# Patient Record
Sex: FEMALE | Race: WHITE | NOT HISPANIC OR LATINO | Employment: OTHER | URBAN - METROPOLITAN AREA
[De-identification: names, ages, dates, MRNs, and addresses within clinical notes are randomized per-mention and may not be internally consistent; named-entity substitution may affect disease eponyms.]

---

## 2018-10-12 LAB — HCV AB SER-ACNC: NEGATIVE

## 2019-09-13 LAB
CHOLEST SERPL-MSCNC: 146 MG/DL (ref 0–200)
HDL/CHOLESTEROL RATIO: 3.2
HDLC SERPL-MCNC: 46 MG/DL
LDLC SERPL CALC-MCNC: 71 MG/DL
TRIGL SERPL-MCNC: 143 MG/DL

## 2019-11-21 ENCOUNTER — OFFICE VISIT (OUTPATIENT)
Dept: FAMILY MEDICINE | Facility: CLINIC | Age: 64
End: 2019-11-21
Payer: MEDICARE

## 2019-11-21 VITALS
DIASTOLIC BLOOD PRESSURE: 78 MMHG | HEIGHT: 60 IN | RESPIRATION RATE: 16 BRPM | WEIGHT: 199.81 LBS | HEART RATE: 78 BPM | BODY MASS INDEX: 39.23 KG/M2 | TEMPERATURE: 98 F | OXYGEN SATURATION: 99 % | SYSTOLIC BLOOD PRESSURE: 124 MMHG

## 2019-11-21 DIAGNOSIS — F17.219 CIGARETTE NICOTINE DEPENDENCE WITH NICOTINE-INDUCED DISORDER: Chronic | ICD-10-CM

## 2019-11-21 DIAGNOSIS — E87.8 HYPOCHLOREMIA: ICD-10-CM

## 2019-11-21 DIAGNOSIS — H65.92 LEFT OTITIS MEDIA WITH EFFUSION: Primary | ICD-10-CM

## 2019-11-21 DIAGNOSIS — F33.1 MODERATE EPISODE OF RECURRENT MAJOR DEPRESSIVE DISORDER: ICD-10-CM

## 2019-11-21 DIAGNOSIS — E66.01 CLASS 2 SEVERE OBESITY DUE TO EXCESS CALORIES WITH SERIOUS COMORBIDITY AND BODY MASS INDEX (BMI) OF 39.0 TO 39.9 IN ADULT: ICD-10-CM

## 2019-11-21 DIAGNOSIS — E87.1 HYPONATREMIA: ICD-10-CM

## 2019-11-21 DIAGNOSIS — R79.89 LOW TSH LEVEL: ICD-10-CM

## 2019-11-21 DIAGNOSIS — I10 ESSENTIAL HYPERTENSION: Chronic | ICD-10-CM

## 2019-11-21 DIAGNOSIS — E83.42 HYPOMAGNESEMIA: ICD-10-CM

## 2019-11-21 DIAGNOSIS — E53.8 LOW SERUM VITAMIN B12: ICD-10-CM

## 2019-11-21 DIAGNOSIS — R19.7 DIARRHEA, UNSPECIFIED TYPE: ICD-10-CM

## 2019-11-21 DIAGNOSIS — J30.9 CHRONIC ALLERGIC RHINITIS: ICD-10-CM

## 2019-11-21 DIAGNOSIS — E11.42 TYPE 2 DIABETES MELLITUS WITH DIABETIC POLYNEUROPATHY, WITHOUT LONG-TERM CURRENT USE OF INSULIN: ICD-10-CM

## 2019-11-21 DIAGNOSIS — M17.11 PRIMARY OSTEOARTHRITIS OF RIGHT KNEE: ICD-10-CM

## 2019-11-21 DIAGNOSIS — Z79.899 ENCOUNTER FOR LONG-TERM CURRENT USE OF MEDICATION: ICD-10-CM

## 2019-11-21 DIAGNOSIS — Z13.21 ENCOUNTER FOR VITAMIN DEFICIENCY SCREENING: ICD-10-CM

## 2019-11-21 DIAGNOSIS — Z13.820 SCREENING FOR OSTEOPOROSIS: ICD-10-CM

## 2019-11-21 DIAGNOSIS — M85.89 OSTEOPENIA OF MULTIPLE SITES: ICD-10-CM

## 2019-11-21 DIAGNOSIS — J43.9 PULMONARY EMPHYSEMA, UNSPECIFIED EMPHYSEMA TYPE: Chronic | ICD-10-CM

## 2019-11-21 PROBLEM — E89.41 SYMPTOMATIC POSTSURGICAL MENOPAUSE: Chronic | Status: ACTIVE | Noted: 2019-11-21

## 2019-11-21 PROBLEM — M43.16 SPONDYLOLISTHESIS OF LUMBAR REGION: Chronic | Status: ACTIVE | Noted: 2019-11-21

## 2019-11-21 PROBLEM — E78.5 HYPERLIPIDEMIA: Status: ACTIVE | Noted: 2019-11-21

## 2019-11-21 PROBLEM — J43.8 OTHER EMPHYSEMA: Chronic | Status: ACTIVE | Noted: 2019-11-21

## 2019-11-21 PROBLEM — M43.06 LUMBAR SPONDYLOLYSIS: Chronic | Status: ACTIVE | Noted: 2019-11-21

## 2019-11-21 PROBLEM — E78.2 MIXED HYPERLIPIDEMIA: Chronic | Status: ACTIVE | Noted: 2019-11-21

## 2019-11-21 PROBLEM — E89.41 SYMPTOMATIC POSTSURGICAL MENOPAUSE: Status: ACTIVE | Noted: 2019-11-21

## 2019-11-21 PROCEDURE — 99214 PR OFFICE/OUTPT VISIT, EST, LEVL IV, 30-39 MIN: ICD-10-PCS | Mod: 25,S$GLB,, | Performed by: INTERNAL MEDICINE

## 2019-11-21 PROCEDURE — 96372 PR INJECTION,THERAP/PROPH/DIAG2ST, IM OR SUBCUT: ICD-10-PCS | Mod: S$GLB,,, | Performed by: INTERNAL MEDICINE

## 2019-11-21 PROCEDURE — 99999 PR PBB SHADOW E&M-NEW PATIENT-LVL IV: ICD-10-PCS | Mod: PBBFAC,,, | Performed by: INTERNAL MEDICINE

## 2019-11-21 PROCEDURE — 99999 PR PBB SHADOW E&M-NEW PATIENT-LVL IV: CPT | Mod: PBBFAC,,, | Performed by: INTERNAL MEDICINE

## 2019-11-21 PROCEDURE — 96372 THER/PROPH/DIAG INJ SC/IM: CPT | Mod: S$GLB,,, | Performed by: INTERNAL MEDICINE

## 2019-11-21 PROCEDURE — 99214 OFFICE O/P EST MOD 30 MIN: CPT | Mod: 25,S$GLB,, | Performed by: INTERNAL MEDICINE

## 2019-11-21 RX ORDER — METOCLOPRAMIDE 5 MG/1
5 TABLET ORAL 2 TIMES DAILY PRN
COMMUNITY
End: 2019-11-21 | Stop reason: SDUPTHER

## 2019-11-21 RX ORDER — LANOLIN ALCOHOL/MO/W.PET/CERES
400 CREAM (GRAM) TOPICAL DAILY
COMMUNITY
End: 2019-11-21

## 2019-11-21 RX ORDER — ONDANSETRON HYDROCHLORIDE 8 MG/1
8 TABLET, FILM COATED ORAL EVERY 8 HOURS PRN
Qty: 90 TABLET | Refills: 0 | Status: SHIPPED | OUTPATIENT
Start: 2019-11-21 | End: 2020-01-31

## 2019-11-21 RX ORDER — LANOLIN ALCOHOL/MO/W.PET/CERES
400 CREAM (GRAM) TOPICAL DAILY
COMMUNITY
End: 2019-12-20

## 2019-11-21 RX ORDER — MONTELUKAST SODIUM 10 MG/1
10 TABLET ORAL NIGHTLY
Qty: 90 TABLET | Refills: 0 | Status: SHIPPED | OUTPATIENT
Start: 2019-11-21 | End: 2019-12-21

## 2019-11-21 RX ORDER — OXYCODONE AND ACETAMINOPHEN 10; 325 MG/1; MG/1
1 TABLET ORAL NIGHTLY PRN
Refills: 0 | COMMUNITY
Start: 2019-10-22 | End: 2019-12-21

## 2019-11-21 RX ORDER — LEVOTHYROXINE SODIUM 100 UG/1
100 TABLET ORAL DAILY
COMMUNITY
End: 2019-11-21

## 2019-11-21 RX ORDER — ONDANSETRON HYDROCHLORIDE 8 MG/1
TABLET, FILM COATED ORAL EVERY 8 HOURS PRN
COMMUNITY
End: 2019-11-21 | Stop reason: SDUPTHER

## 2019-11-21 RX ORDER — PRAVASTATIN SODIUM 40 MG/1
40 TABLET ORAL DAILY
COMMUNITY
End: 2020-01-31 | Stop reason: SDUPTHER

## 2019-11-21 RX ORDER — AZITHROMYCIN 250 MG/1
TABLET, FILM COATED ORAL
Qty: 6 TABLET | Refills: 0 | Status: SHIPPED | OUTPATIENT
Start: 2019-11-21 | End: 2019-11-26

## 2019-11-21 RX ORDER — METFORMIN HYDROCHLORIDE 1000 MG/1
1000 TABLET ORAL 2 TIMES DAILY WITH MEALS
COMMUNITY
End: 2020-01-31 | Stop reason: SDUPTHER

## 2019-11-21 RX ORDER — IPRATROPIUM BROMIDE AND ALBUTEROL SULFATE 2.5; .5 MG/3ML; MG/3ML
3 SOLUTION RESPIRATORY (INHALATION) EVERY 6 HOURS PRN
COMMUNITY
End: 2020-01-31 | Stop reason: SDUPTHER

## 2019-11-21 RX ORDER — QUETIAPINE FUMARATE 25 MG/1
100 TABLET, FILM COATED ORAL NIGHTLY
COMMUNITY
End: 2019-11-21 | Stop reason: SDUPTHER

## 2019-11-21 RX ORDER — ASPIRIN 81 MG/1
81 TABLET ORAL DAILY
COMMUNITY
End: 2020-07-06

## 2019-11-21 RX ORDER — METHYLPREDNISOLONE ACETATE 40 MG/ML
40 INJECTION, SUSPENSION INTRA-ARTICULAR; INTRALESIONAL; INTRAMUSCULAR; SOFT TISSUE
Status: COMPLETED | OUTPATIENT
Start: 2019-11-21 | End: 2019-11-21

## 2019-11-21 RX ORDER — PROMETHAZINE HYDROCHLORIDE AND DEXTROMETHORPHAN HYDROBROMIDE 6.25; 15 MG/5ML; MG/5ML
5 SYRUP ORAL EVERY 6 HOURS PRN
Qty: 240 ML | Refills: 0 | Status: SHIPPED | OUTPATIENT
Start: 2019-11-21 | End: 2019-12-01

## 2019-11-21 RX ORDER — DESVENLAFAXINE SUCCINATE 50 MG/1
50 TABLET, EXTENDED RELEASE ORAL DAILY
Qty: 30 TABLET | Refills: 0 | Status: SHIPPED | OUTPATIENT
Start: 2019-11-21 | End: 2019-12-20 | Stop reason: SDUPTHER

## 2019-11-21 RX ORDER — LEVOCETIRIZINE DIHYDROCHLORIDE 5 MG/1
5 TABLET, FILM COATED ORAL NIGHTLY
COMMUNITY
End: 2019-11-21

## 2019-11-21 RX ORDER — DICYCLOMINE HYDROCHLORIDE 10 MG/1
10 CAPSULE ORAL 3 TIMES DAILY PRN
COMMUNITY
End: 2019-11-21

## 2019-11-21 RX ORDER — PREDNISONE 20 MG/1
40 TABLET ORAL DAILY
Qty: 10 TABLET | Refills: 0 | Status: SHIPPED | OUTPATIENT
Start: 2019-11-22 | End: 2019-11-27

## 2019-11-21 RX ORDER — METOCLOPRAMIDE 5 MG/1
5 TABLET ORAL 2 TIMES DAILY PRN
Qty: 90 TABLET | Refills: 0 | Status: SHIPPED | OUTPATIENT
Start: 2019-11-21 | End: 2020-01-31

## 2019-11-21 RX ORDER — HYDROCODONE BITARTRATE AND ACETAMINOPHEN 7.5; 325 MG/1; MG/1
1 TABLET ORAL EVERY 8 HOURS PRN
Refills: 0 | COMMUNITY
Start: 2019-11-19 | End: 2019-12-04

## 2019-11-21 RX ORDER — LISINOPRIL AND HYDROCHLOROTHIAZIDE 12.5; 2 MG/1; MG/1
1 TABLET ORAL DAILY
COMMUNITY
End: 2019-12-19

## 2019-11-21 RX ORDER — PREGABALIN 100 MG/1
100 CAPSULE ORAL 2 TIMES DAILY
COMMUNITY
End: 2019-12-04 | Stop reason: SDUPTHER

## 2019-11-21 RX ORDER — DICLOFENAC SODIUM 1 MG/ML
1 SOLUTION/ DROPS OPHTHALMIC 4 TIMES DAILY
COMMUNITY
End: 2020-01-31

## 2019-11-21 RX ORDER — QUETIAPINE FUMARATE 100 MG/1
100 TABLET, FILM COATED ORAL NIGHTLY
Qty: 90 TABLET | Refills: 0 | Status: SHIPPED | OUTPATIENT
Start: 2019-11-21 | End: 2019-12-20

## 2019-11-21 RX ADMIN — METHYLPREDNISOLONE ACETATE 40 MG: 40 INJECTION, SUSPENSION INTRA-ARTICULAR; INTRALESIONAL; INTRAMUSCULAR; SOFT TISSUE at 11:11
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
## 2019-11-21 NOTE — PATIENT INSTRUCTIONS
Stop magnesium. Let me know if diarrhea gets better in 1 week.     Stop cymbalta. Starting pristiq. Start tomorrow. Let me know in 1 month how you're feeling.     i'm start allergy medicine- take at night. Sending you in antibiotic and steroid and cough syrup (don't take seroquel tonight with it).     Fasting labs when you're better.    Bone density when you're better.     Try taking 100 mg of seroquel when you're better and see if it helps.    See me in 1 month.     jarrell

## 2019-12-04 RX ORDER — HYDROCODONE BITARTRATE AND ACETAMINOPHEN 5; 325 MG/1; MG/1
1 TABLET ORAL EVERY 6 HOURS PRN
Refills: 0 | COMMUNITY
Start: 2019-11-27 | End: 2019-12-21

## 2019-12-04 RX ORDER — PREGABALIN 100 MG/1
100 CAPSULE ORAL 2 TIMES DAILY
Qty: 60 CAPSULE | Refills: 2 | Status: SHIPPED | OUTPATIENT
Start: 2019-12-04 | End: 2020-03-27 | Stop reason: SDUPTHER

## 2019-12-04 NOTE — TELEPHONE ENCOUNTER
----- Message from Enid Painting sent at 12/4/2019  9:10 AM CST -----  Contact: self  Type:  RX Refill Request    Who Called: Booker Carrasquillo or New Rx:refill  RX Name and Strength:pregabalin (LYRICA) 100 MG capsule  How is the patient currently taking it? (ex. 1XDay):2xday  Is this a 30 day or 90 day RX:30  Preferred Pharmacy with phone number:  [email or web address] [place], [place] - [address] [facility]  [address] [address] [address]  Phone: [phone] Fax: [phone]    Local or Mail Order:local  Ordering Provider:Dago  Would the patient rather a call back or a response via [facility]? call  Best Call Back Number:[phone]  Additional Information:

## 2019-12-06 ENCOUNTER — HOSPITAL ENCOUNTER (OUTPATIENT)
Dept: RADIOLOGY | Facility: HOSPITAL | Age: 64
Discharge: HOME | End: 2019-12-06
Attending: INTERNAL MEDICINE
Payer: MEDICARE

## 2019-12-06 DIAGNOSIS — Z79.899 ENCOUNTER FOR LONG-TERM CURRENT USE OF MEDICATION: ICD-10-CM

## 2019-12-06 DIAGNOSIS — H65.92 LEFT OTITIS MEDIA WITH EFFUSION: ICD-10-CM

## 2019-12-06 DIAGNOSIS — R19.7 DIARRHEA, UNSPECIFIED TYPE: ICD-10-CM

## 2019-12-06 DIAGNOSIS — E87.1 HYPONATREMIA: ICD-10-CM

## 2019-12-06 DIAGNOSIS — E83.42 HYPOMAGNESEMIA: ICD-10-CM

## 2019-12-06 DIAGNOSIS — Z13.820 SCREENING FOR OSTEOPOROSIS: ICD-10-CM

## 2019-12-06 DIAGNOSIS — M85.89 OSTEOPENIA OF MULTIPLE SITES: ICD-10-CM

## 2019-12-06 DIAGNOSIS — E11.42 TYPE 2 DIABETES MELLITUS WITH DIABETIC POLYNEUROPATHY, WITHOUT LONG-TERM CURRENT USE OF INSULIN: ICD-10-CM

## 2019-12-06 DIAGNOSIS — R79.89 LOW TSH LEVEL: ICD-10-CM

## 2019-12-06 DIAGNOSIS — Z13.21 ENCOUNTER FOR VITAMIN DEFICIENCY SCREENING: ICD-10-CM

## 2019-12-06 DIAGNOSIS — F33.1 MODERATE EPISODE OF RECURRENT MAJOR DEPRESSIVE DISORDER: ICD-10-CM

## 2019-12-06 DIAGNOSIS — E87.8 HYPOCHLOREMIA: ICD-10-CM

## 2019-12-06 DIAGNOSIS — E53.8 LOW SERUM VITAMIN B12: ICD-10-CM

## 2019-12-06 DIAGNOSIS — J30.9 CHRONIC ALLERGIC RHINITIS: ICD-10-CM

## 2019-12-06 PROCEDURE — 77080 DXA BONE DENSITY AXIAL: CPT | Mod: 26,,, | Performed by: RADIOLOGY

## 2019-12-06 PROCEDURE — 77080 DEXA BONE DENSITY SPINE HIP: ICD-10-PCS | Mod: 26,,, | Performed by: RADIOLOGY

## 2019-12-06 PROCEDURE — 77080 DXA BONE DENSITY AXIAL: CPT | Mod: TC,PO

## 2019-12-16 ENCOUNTER — TELEPHONE (OUTPATIENT)
Dept: FAMILY MEDICINE | Facility: CLINIC | Age: 64
End: 2019-12-16

## 2019-12-16 NOTE — TELEPHONE ENCOUNTER
----- Message from Uyen Jean sent at 12/16/2019  1:54 PM CST -----  Contact: Patient   Patient would like a call back at [phone], Regards to her medication.    Thanks  Td

## 2019-12-16 NOTE — TELEPHONE ENCOUNTER
Patient has symptoms of a URI (Chest congestion, cough, nasal congestion), patient is also nauseous from all of the phlem. Please advise.     [place]armacy [place].

## 2019-12-16 NOTE — TELEPHONE ENCOUNTER
----- Message from Luma Davis sent at 12/16/2019  8:59 AM CST -----  Contact: Pt  Please give pt a call at .[phone] (home) she is calling to speak with the nurse and won't give any details of the call

## 2019-12-16 NOTE — TELEPHONE ENCOUNTER
Please see if we can get her an appointment. She was just seen. I don't want to expose her to more antibiotics

## 2019-12-17 ENCOUNTER — TELEPHONE (OUTPATIENT)
Dept: FAMILY MEDICINE | Facility: CLINIC | Age: 64
End: 2019-12-17

## 2019-12-17 ENCOUNTER — HOSPITAL ENCOUNTER (OUTPATIENT)
Dept: RADIOLOGY | Facility: HOSPITAL | Age: 64
Discharge: HOME | End: 2019-12-17
Attending: NURSE PRACTITIONER
Payer: MEDICARE

## 2019-12-17 ENCOUNTER — OFFICE VISIT (OUTPATIENT)
Dept: FAMILY MEDICINE | Facility: CLINIC | Age: 64
End: 2019-12-17
Payer: MEDICARE

## 2019-12-17 VITALS
SYSTOLIC BLOOD PRESSURE: 110 MMHG | HEIGHT: 60 IN | WEIGHT: 196.19 LBS | BODY MASS INDEX: 38.52 KG/M2 | DIASTOLIC BLOOD PRESSURE: 58 MMHG | HEART RATE: 86 BPM | OXYGEN SATURATION: 96 % | TEMPERATURE: 98 F

## 2019-12-17 DIAGNOSIS — R06.2 WHEEZING: ICD-10-CM

## 2019-12-17 DIAGNOSIS — R05.9 COUGH: ICD-10-CM

## 2019-12-17 DIAGNOSIS — R50.9 FEVER, UNSPECIFIED FEVER CAUSE: ICD-10-CM

## 2019-12-17 DIAGNOSIS — J32.9 SINUSITIS, UNSPECIFIED CHRONICITY, UNSPECIFIED LOCATION: Primary | ICD-10-CM

## 2019-12-17 PROCEDURE — 71046 X-RAY EXAM CHEST 2 VIEWS: CPT | Mod: TC,PO

## 2019-12-17 PROCEDURE — 99999 PR PBB SHADOW E&M-EST. PATIENT-LVL V: ICD-10-PCS | Mod: PBBFAC,,, | Performed by: NURSE PRACTITIONER

## 2019-12-17 PROCEDURE — 3008F PR BODY MASS INDEX (BMI) DOCUMENTED: ICD-10-PCS | Mod: CPTII,S$GLB,, | Performed by: NURSE PRACTITIONER

## 2019-12-17 PROCEDURE — 99213 OFFICE O/P EST LOW 20 MIN: CPT | Mod: S$GLB,,, | Performed by: NURSE PRACTITIONER

## 2019-12-17 PROCEDURE — 71046 X-RAY EXAM CHEST 2 VIEWS: CPT | Mod: 26,,, | Performed by: RADIOLOGY

## 2019-12-17 PROCEDURE — 3008F BODY MASS INDEX DOCD: CPT | Mod: CPTII,S$GLB,, | Performed by: NURSE PRACTITIONER

## 2019-12-17 PROCEDURE — 71046 XR CHEST PA AND LATERAL: ICD-10-PCS | Mod: 26,,, | Performed by: RADIOLOGY

## 2019-12-17 PROCEDURE — 99999 PR PBB SHADOW E&M-EST. PATIENT-LVL V: CPT | Mod: PBBFAC,,, | Performed by: NURSE PRACTITIONER

## 2019-12-17 PROCEDURE — 99213 PR OFFICE/OUTPT VISIT, EST, LEVL III, 20-29 MIN: ICD-10-PCS | Mod: S$GLB,,, | Performed by: NURSE PRACTITIONER

## 2019-12-17 RX ORDER — ALBUTEROL SULFATE 90 UG/1
2 AEROSOL, METERED RESPIRATORY (INHALATION) EVERY 6 HOURS PRN
Qty: 18 G | Refills: 0 | Status: SHIPPED | OUTPATIENT
Start: 2019-12-17 | End: 2020-01-31 | Stop reason: SDUPTHER

## 2019-12-17 RX ORDER — LEVOCETIRIZINE DIHYDROCHLORIDE 5 MG/1
TABLET, FILM COATED ORAL
COMMUNITY
Start: 2019-10-09 | End: 2020-01-31

## 2019-12-17 RX ORDER — LEVOTHYROXINE SODIUM 100 UG/1
TABLET ORAL
COMMUNITY
Start: 2019-10-09 | End: 2019-12-20

## 2019-12-17 RX ORDER — DULOXETIN HYDROCHLORIDE 60 MG/1
CAPSULE, DELAYED RELEASE ORAL
COMMUNITY
Start: 2019-10-03 | End: 2019-12-20

## 2019-12-17 RX ORDER — PROMETHAZINE HYDROCHLORIDE AND DEXTROMETHORPHAN HYDROBROMIDE 6.25; 15 MG/5ML; MG/5ML
5 SYRUP ORAL 2 TIMES DAILY PRN
Qty: 118 ML | Refills: 0 | Status: SHIPPED | OUTPATIENT
Start: 2019-12-17 | End: 2019-12-21

## 2019-12-17 RX ORDER — LANCETS
1 EACH MISCELLANEOUS
COMMUNITY
Start: 2019-03-27 | End: 2020-06-29 | Stop reason: SDUPTHER

## 2019-12-17 RX ORDER — CEFDINIR 300 MG/1
300 CAPSULE ORAL 2 TIMES DAILY
Qty: 20 CAPSULE | Refills: 0 | Status: SHIPPED | OUTPATIENT
Start: 2019-12-17 | End: 2019-12-27

## 2019-12-17 RX ORDER — INSULIN PUMP SYRINGE, 3 ML
EACH MISCELLANEOUS
COMMUNITY
Start: 2019-03-27

## 2019-12-17 NOTE — PROGRESS NOTES
Subjective:       Patient ID: Fior Esparza is a 64 y.o. female.    Chief Complaint: Cough; Fever; Headache; Nasal Congestion; Otalgia; and Sinusitis    Cough   This is a new problem. The current episode started in the past 7 days. The problem has been unchanged. The cough is productive of sputum. Associated symptoms include ear pain, a fever, headaches, nasal congestion, postnasal drip, rhinorrhea, a sore throat and wheezing. Pertinent negatives include no chest pain, chills, ear congestion, heartburn, hemoptysis, myalgias, rash, shortness of breath, sweats or weight loss. Nothing aggravates the symptoms. She has tried nothing for the symptoms. Her past medical history is significant for COPD and pneumonia (per pt). There is no history of asthma, bronchiectasis, bronchitis, emphysema or environmental allergies.     Past Medical History:   Diagnosis Date    Chronic allergic rhinitis 11/21/2019    COPD with emphysema 11/21/2019    Essential hypertension 11/21/2019    Mixed hyperlipidemia 11/21/2019    Moderate episode of recurrent major depressive disorder 11/21/2019    Osteopenia of multiple sites  11/21/2019    Spondylolisthesis of lumbar region 11/21/2019    S/p l4-l5 fusion     Subclinical hypothyroidism     Symptomatic postsurgical menopause 11/21/2019    S/p KIP BSO    TIA (transient ischemic attack)     Type 2 diabetes mellitus with diabetic polyneuropathy, without long-term current use of insulin 11/21/2019     Social History     Socioeconomic History    Marital status:      Spouse name: Not on file    Number of children: Not on file    Years of education: Not on file    Highest education level: Not on file   Occupational History    Not on file   Social Needs    Financial resource strain: Not on file    Food insecurity:     Worry: Not on file     Inability: Not on file    Transportation needs:     Medical: Not on file     Non-medical: Not on file   Tobacco Use    Smoking status:  Current Every Day Smoker     Packs/day: 2.00     Years: 50.00     Pack years: 100.00     Types: Cigarettes    Smokeless tobacco: Never Used   Substance and Sexual Activity    Alcohol use: Not Currently    Drug use: Never    Sexual activity: Not Currently     Partners: Male     Birth control/protection: Surgical, Post-menopausal   Lifestyle    Physical activity:     Days per week: Not on file     Minutes per session: Not on file    Stress: Only a little   Relationships    Social connections:     Talks on phone: Not on file     Gets together: Not on file     Attends [employer] service: Not on file     Active member of club or organization: Not on file     Attends meetings of clubs or organizations: Not on file     Relationship status: Not on file   Other Topics Concern    Not on file   Social History Narrative    Not on file     Past Surgical History:   Procedure Laterality Date    BACK SURGERY      CARPAL TUNNEL RELEASE Bilateral 04/17/2015, 11/7/14    dr. gomez    CATARACT EXTRACTION, BILATERAL  09/2018    CHOLECYSTECTOMY      EXTREME LATERAL INTERBODY FUSION (XLIF) OF SPINE  07/07/15, 5/9/19    elías    GANGLION CYST EXCISION Right     right wrist    TOTAL ABDOMINAL HYSTERECTOMY W/ BILATERAL SALPINGOOPHORECTOMY      TOTAL KNEE ARTHROPLASTY Right 10/2019    dr. covington (revision of prior)       Review of Systems   Constitutional: Positive for fever. Negative for chills and weight loss.   HENT: Positive for ear pain, postnasal drip, rhinorrhea, sinus pressure and sore throat.    Eyes: Negative.    Respiratory: Positive for wheezing. Negative for hemoptysis and shortness of breath.    Cardiovascular: Negative.  Negative for chest pain.   Gastrointestinal: Negative.  Negative for heartburn.   Endocrine: Negative.    Genitourinary: Negative.    Musculoskeletal: Negative.  Negative for myalgias.   Skin: Negative.  Negative for rash.   Allergic/Immunologic: Negative.  Negative for environmental  allergies.   Neurological: Positive for headaches.   Psychiatric/Behavioral: Negative.        Objective:      Physical Exam   Constitutional: She is oriented to person, place, and time. She appears well-developed and well-nourished.   HENT:   Head: Normocephalic.   Right Ear: Hearing, tympanic membrane, external ear and ear canal normal.   Left Ear: Hearing, tympanic membrane, external ear and ear canal normal.   Nose: Mucosal edema and rhinorrhea present. Right sinus exhibits maxillary sinus tenderness. Right sinus exhibits no frontal sinus tenderness. Left sinus exhibits maxillary sinus tenderness. Left sinus exhibits no frontal sinus tenderness.   Mouth/Throat: Uvula is midline, oropharynx is clear and moist and mucous membranes are normal.   Eyes: Pupils are equal, round, and reactive to light. Conjunctivae are normal.   Neck: Normal range of motion. Neck supple.   Cardiovascular: Normal rate, regular rhythm and normal heart sounds.   Pulmonary/Chest: Effort normal. She has wheezes in the right upper field, the right lower field, the left upper field and the left lower field.   Abdominal: Soft. Bowel sounds are normal.   Musculoskeletal: Normal range of motion.   Neurological: She is alert and oriented to person, place, and time.   Skin: Skin is warm and dry. Capillary refill takes 2 to 3 seconds.   Psychiatric: She has a normal mood and affect. Her behavior is normal. Judgment and thought content normal.   Nursing note and vitals reviewed.      Assessment:       1. Sinusitis, unspecified chronicity, unspecified location   2. Wheezing    3. Fever, unspecified fever cause    4.      Cough   Plan:           Fior was seen today for cough, fever, headache, nasal congestion, otalgia and sinusitis.    Diagnoses and all orders for this visit:    Sinusitis, unspecified chronicity, unspecified location  Cough  Wheezing  Fever, unspecified fever cause  -     X-Ray Chest PA And Lateral; Future  -      promethazine-dextromethorphan (PROMETHAZINE-DM) 6.25-15 mg/5 mL Syrp; Take 5 mLs by mouth 2 (two) times daily as needed.  -     albuterol (PROAIR HFA) 90 mcg/actuation inhaler; Inhale 2 puffs into the lungs every 6 (six) hours as needed for Wheezing. Rescue  -     cefdinir (OMNICEF) 300 MG capsule; Take 1 capsule (300 mg total) by mouth 2 (two) times daily. for 10 days  Report to ER immediately if symptoms worsen

## 2019-12-17 NOTE — TELEPHONE ENCOUNTER
----- Message from Belia Huerta sent at 12/17/2019  4:13 PM CST -----  Contact: self [phone]  Pt would like to have another medication called to pharmacy for cough; cough syrup that was sent is out of stock.. Please call back at [phone].  Md Susannah        PT uses.  [facility], [facility] [place], [place] - [address] Replaced by [facility] [address] [id number] [address] [address]  Phone: [phone] Fax: [phone]

## 2019-12-17 NOTE — TELEPHONE ENCOUNTER
Pt was seen today by Maryann and Maryann is gone for the day. Pt is requesting a different cough med.

## 2019-12-17 NOTE — PATIENT INSTRUCTIONS
Cough medication causes DROWSINESS  Cough medication can increase BG; stop if  or more  Allow at least 10-12 h bx reglan and cough med  Albuterol inhaler can cause jitteriness, palpitations  Hydrate well  Report to ER immediately if symptoms worsen

## 2019-12-17 NOTE — TELEPHONE ENCOUNTER
----- Message from Luma Davis sent at 12/17/2019 10:59 AM CST -----  Contact: Pt  Please give pt a call at .[phone] (home) [phone] (work)she is calling to see if she can be worked in today for cold symptoms

## 2019-12-19 RX ORDER — BENZONATATE 200 MG/1
200 CAPSULE ORAL 3 TIMES DAILY PRN
Qty: 30 CAPSULE | Refills: 0 | Status: SHIPPED | OUTPATIENT
Start: 2019-12-19 | End: 2019-12-29

## 2019-12-19 RX ORDER — LISINOPRIL AND HYDROCHLOROTHIAZIDE 12.5; 2 MG/1; MG/1
TABLET ORAL
Qty: 90 TABLET | Refills: 0 | Status: SHIPPED | OUTPATIENT
Start: 2019-12-19 | End: 2019-12-20

## 2019-12-19 NOTE — TELEPHONE ENCOUNTER
This looks like it was routed to her PCP 2 d ago. It appears as though the PCP forwarded it back to me, however I was out on yesterday. Did her PCP address this?

## 2019-12-19 NOTE — TELEPHONE ENCOUNTER
This was not addressed, called pt to check in on medication to see if it was still unavailable. Left vm.

## 2019-12-20 ENCOUNTER — TELEPHONE (OUTPATIENT)
Dept: FAMILY MEDICINE | Facility: CLINIC | Age: 64
End: 2019-12-20

## 2019-12-20 ENCOUNTER — OFFICE VISIT (OUTPATIENT)
Dept: FAMILY MEDICINE | Facility: CLINIC | Age: 64
End: 2019-12-20
Payer: MEDICARE

## 2019-12-20 VITALS
DIASTOLIC BLOOD PRESSURE: 65 MMHG | RESPIRATION RATE: 17 BRPM | WEIGHT: 196.19 LBS | HEIGHT: 61 IN | SYSTOLIC BLOOD PRESSURE: 108 MMHG | BODY MASS INDEX: 37.04 KG/M2 | HEART RATE: 79 BPM | TEMPERATURE: 98 F | OXYGEN SATURATION: 97 %

## 2019-12-20 DIAGNOSIS — J43.2 CENTRILOBULAR EMPHYSEMA: ICD-10-CM

## 2019-12-20 DIAGNOSIS — Z12.31 ENCOUNTER FOR SCREENING MAMMOGRAM FOR MALIGNANT NEOPLASM OF BREAST: ICD-10-CM

## 2019-12-20 DIAGNOSIS — R06.02 SHORTNESS OF BREATH: ICD-10-CM

## 2019-12-20 DIAGNOSIS — F17.219 CIGARETTE NICOTINE DEPENDENCE WITH NICOTINE-INDUCED DISORDER: Chronic | ICD-10-CM

## 2019-12-20 DIAGNOSIS — R05.3 COUGH, PERSISTENT: ICD-10-CM

## 2019-12-20 DIAGNOSIS — F33.1 MODERATE EPISODE OF RECURRENT MAJOR DEPRESSIVE DISORDER: Primary | ICD-10-CM

## 2019-12-20 DIAGNOSIS — E11.42 TYPE 2 DIABETES MELLITUS WITH DIABETIC POLYNEUROPATHY, WITHOUT LONG-TERM CURRENT USE OF INSULIN: ICD-10-CM

## 2019-12-20 DIAGNOSIS — R63.4 UNINTENTIONAL WEIGHT LOSS: ICD-10-CM

## 2019-12-20 DIAGNOSIS — I10 ESSENTIAL HYPERTENSION: ICD-10-CM

## 2019-12-20 DIAGNOSIS — R19.7 DIARRHEA, UNSPECIFIED TYPE: ICD-10-CM

## 2019-12-20 PROCEDURE — 99214 OFFICE O/P EST MOD 30 MIN: CPT | Mod: S$GLB,,, | Performed by: INTERNAL MEDICINE

## 2019-12-20 PROCEDURE — 99999 PR PBB SHADOW E&M-EST. PATIENT-LVL IV: CPT | Mod: PBBFAC,,, | Performed by: INTERNAL MEDICINE

## 2019-12-20 PROCEDURE — 99214 PR OFFICE/OUTPT VISIT, EST, LEVL IV, 30-39 MIN: ICD-10-PCS | Mod: S$GLB,,, | Performed by: INTERNAL MEDICINE

## 2019-12-20 PROCEDURE — 99999 PR PBB SHADOW E&M-EST. PATIENT-LVL IV: ICD-10-PCS | Mod: PBBFAC,,, | Performed by: INTERNAL MEDICINE

## 2019-12-20 RX ORDER — LISINOPRIL AND HYDROCHLOROTHIAZIDE 10; 12.5 MG/1; MG/1
1 TABLET ORAL DAILY
Qty: 30 TABLET | Refills: 0 | Status: SHIPPED | OUTPATIENT
Start: 2019-12-20 | End: 2020-01-31

## 2019-12-20 RX ORDER — CODEINE PHOSPHATE AND GUAIFENESIN 10; 100 MG/5ML; MG/5ML
10 SOLUTION ORAL 4 TIMES DAILY PRN
Qty: 118 ML | Refills: 0 | Status: SHIPPED | OUTPATIENT
Start: 2019-12-20 | End: 2020-01-31

## 2019-12-20 RX ORDER — HYDROXYZINE HYDROCHLORIDE 25 MG/1
25 TABLET, FILM COATED ORAL SEE ADMIN INSTRUCTIONS
Qty: 90 TABLET | Refills: 0 | Status: SHIPPED | OUTPATIENT
Start: 2019-12-20 | End: 2020-01-31 | Stop reason: SDUPTHER

## 2019-12-20 RX ORDER — DESVENLAFAXINE 100 MG/1
100 TABLET, EXTENDED RELEASE ORAL DAILY
Qty: 30 TABLET | Refills: 0 | Status: SHIPPED | OUTPATIENT
Start: 2019-12-20 | End: 2020-01-31 | Stop reason: SDUPTHER

## 2019-12-20 RX ORDER — PREDNISONE 10 MG/1
TABLET ORAL
Qty: 45 TABLET | Refills: 0 | Status: SHIPPED | OUTPATIENT
Start: 2019-12-20 | End: 2020-01-31

## 2019-12-20 NOTE — PROGRESS NOTES
Subjective:      Patient ID: Fior Esparza is a 64 y.o. female.    Chief Complaint: Cough and Depression    HPI     64F here for follow up of cough and depression.    #Osteoarthritis-Right knee  -S/p right TKA revision with Dr. Duarte on 10/9/19.  seen today and felt stable for discharge from orthopedic standpoint.    #Depression, insomnia  -start on pristiq at last visit and seroquel increased to 100 mg for sleep.   -seroquel not helping. pristiq helping a little. We discussed increasing.   -starting hydroxyzine. Discussed the medication.  -no si    Depression Patient Health Questionnaire 12/20/2019 12/17/2019 11/21/2019   Over the last two weeks how often have you been bothered by little interest or pleasure in doing things 3 1 1   Over the last two weeks how often have you been bothered by feeling down, depressed or hopeless 3 1 3   PHQ-2 Total Score 6 2 4   Over the last two weeks how often have you been bothered by trouble falling or staying asleep, or sleeping too much 3 - 2   Over the last two weeks how often have you been bothered by feeling tired or having little energy 3 - 1   Over the last two weeks how often have you been bothered by a poor appetite or overeating 1 - 1   Over the last two weeks how often have you been bothered by feeling bad about yourself - or that you are a failure or have let yourself or your family down 0 - 2   Over the last two weeks how often have you been bothered by trouble concentrating on things, such as reading the newspaper or watching television 0 - 1   Over the last two weeks how often have you been bothered by moving or speaking so slowly that other people could have noticed. Or the opposite - being so fidgety or restless that you have been moving around a lot more than usual. 0 - 0   Over the last two weeks how often have you been bothered by thoughts that you would be better off dead, or of hurting yourself 0 - 0   If you checked off any problems, how difficult have  these problems made it for you to do your work, take care of things at home or get along with other people? Not difficult at all - Somewhat difficult   Total Score 13 - 11     #Diarrhea  -has been ongoing for the last few months.    -thyroid studies repeated after our last visit which were normal and magnesium was normal.  She discontinue the magnesium, but resumed so we discussed discontinuing altogether and monitoring.  I do have concerns about this as we discussed and we will find out more about her last colonoscopy.     # weight loss, copd  -she has went from 214 lb on 09/13 down to 196 today, 12/21  -we talked today that I am starting to become somewhat concerned about her continuing weight loss.  She is happy about the weight loss, but I am concerned about the rapid progression and her diarrhea as well as non resolving cough.  She does understand that she has severe COPD and continues to smoke, however does not fully explain her weight loss.  She is amenable considering her continued cough and non resolving respiratory symptoms to get a CT of the chest and I have encouraged her highly to get the mammogram as her other screening exams are up-to-date.  -I will give short course of prednisone as she is very tight and wheezing on exam.  But    Bone density showed osteopenia.     #hypertension  -blood pressure noted to be low today.  She has had some low pressures at home as well in the 1 100s over 40s.  We discussed reducing the lisinopril/HCTZ down to 10/12.5 mg from 20/12.5 mg.  We discussed that she will likely need to reduce further at next visit.    Review of patient's allergies indicates:   Allergen Reactions    Contrast media Hives, Shortness Of Breath, Itching and Swelling    Iodinated contrast media Hives, Itching, Shortness Of Breath and Swelling       Current Outpatient Medications:     albuterol (PROAIR HFA) 90 mcg/actuation inhaler, Inhale 2 puffs into the lungs every 6 (six) hours as needed for  Wheezing. Rescue, Disp: 18 g, Rfl: 0    albuterol-ipratropium (DUO-NEB) 2.5 mg-0.5 mg/3 mL nebulizer solution, Take 3 mLs by nebulization every 6 (six) hours as needed for Wheezing. Rescue, Disp: , Rfl:     aspirin (ECOTRIN) 81 MG EC tablet, Take 81 mg by mouth once daily. , Disp: , Rfl:     benzonatate (TESSALON) 200 MG capsule, Take 1 capsule (200 mg total) by mouth 3 (three) times daily as needed., Disp: 30 capsule, Rfl: 0    blood sugar diagnostic Strp, 1 strip., Disp: , Rfl:     blood-glucose meter kit, Use as directed to check blood sugar qid., Disp: , Rfl:     cefdinir (OMNICEF) 300 MG capsule, Take 1 capsule (300 mg total) by mouth 2 (two) times daily. for 10 days, Disp: 20 capsule, Rfl: 0    desvenlafaxine succinate (PRISTIQ) 100 MG Tb24, Take 1 tablet (100 mg total) by mouth once daily., Disp: 30 tablet, Rfl: 0    diclofenac (VOLTAREN) 0.1 % ophthalmic solution, 1 drop 4 (four) times daily., Disp: , Rfl:     fluticasone-umeclidin-vilanter (TRELEGY ELLIPTA) 100-62.5-25 mcg DsDv, Inhale 1 puff into the lungs once daily., Disp: , Rfl:     lancets Misc, 1 strip., Disp: , Rfl:     levocetirizine (XYZAL) 5 MG tablet, TAKE 1 TABLET EVERY DAY, Disp: , Rfl:     metFORMIN (GLUCOPHAGE) 1000 MG tablet, Take 1,000 mg by mouth 2 (two) times daily with meals., Disp: , Rfl:     metoclopramide HCl (REGLAN) 5 MG tablet, Take 1 tablet (5 mg total) by mouth 2 (two) times daily as needed., Disp: 90 tablet, Rfl: 0    montelukast (SINGULAIR) 10 mg tablet, Take 1 tablet (10 mg total) by mouth every evening., Disp: 90 tablet, Rfl: 0    ondansetron (ZOFRAN) 8 MG tablet, Take 1 tablet (8 mg total) by mouth every 8 (eight) hours as needed for Nausea., Disp: 90 tablet, Rfl: 0    pravastatin (PRAVACHOL) 40 MG tablet, Take 40 mg by mouth once daily., Disp: , Rfl:     pregabalin (LYRICA) 100 MG capsule, Take 1 capsule (100 mg total) by mouth 2 (two) times daily., Disp: 60 capsule, Rfl: 2    SITagliptin (JANUVIA) 25 MG  Tab, Take 25 mg by mouth once daily. , Disp: , Rfl:     guaifenesin-codeine 100-10 mg/5 ml (TUSSI-ORGANIDIN NR)  mg/5 mL syrup, Take 10 mLs by mouth 4 (four) times daily as needed for Cough or Congestion (causes sedation)., Disp: 118 mL, Rfl: 0    hydrOXYzine HCl (ATARAX) 25 MG tablet, Take 1 tablet (25 mg total) by mouth As instructed (sleep 2 BID for anxiety). 1nightly for sleep PRN. May increase to 2 pills if tolerating, Disp: 90 tablet, Rfl: 0    lisinopril-hydrochlorothiazide (PRINZIDE,ZESTORETIC) 10-12.5 mg per tablet, Take 1 tablet by mouth once daily., Disp: 30 tablet, Rfl: 0    predniSONE (DELTASONE) 10 MG tablet, Take 4 daily for 7 days,2 daily for 5 days,1 daily for 2 days,stop, Disp: 45 tablet, Rfl: 0    Past Medical History:   Diagnosis Date    Chronic allergic rhinitis 11/21/2019    COPD with emphysema 11/21/2019    Essential hypertension 11/21/2019    Mixed hyperlipidemia 11/21/2019    Moderate episode of recurrent major depressive disorder 11/21/2019    Osteopenia of multiple sites  11/21/2019    Spondylolisthesis of lumbar region 11/21/2019    S/p l4-l5 fusion     Subclinical hypothyroidism     Symptomatic postsurgical menopause 11/21/2019    S/p KIP BSO    TIA (transient ischemic attack)     Type 2 diabetes mellitus with diabetic polyneuropathy, without long-term current use of insulin 11/21/2019     Past Surgical History:   Procedure Laterality Date    BACK SURGERY      CARPAL TUNNEL RELEASE Bilateral 04/17/2015, 11/7/14    dr. gomez    CATARACT EXTRACTION, BILATERAL  09/2018    CHOLECYSTECTOMY      EXTREME LATERAL INTERBODY FUSION (XLIF) OF SPINE  07/07/15, 5/9/19    elaís    GANGLION CYST EXCISION Right     right wrist    TOTAL ABDOMINAL HYSTERECTOMY W/ BILATERAL SALPINGOOPHORECTOMY      TOTAL KNEE ARTHROPLASTY Right 10/2019    dr. covington (revision of prior)     Family History   Problem Relation Age of Onset    Rheum arthritis Mother     Diabetes Father      Breast cancer Maternal Aunt      Social History     Socioeconomic History    Marital status:      Spouse name: Not on file    Number of children: Not on file    Years of education: Not on file    Highest education level: Not on file   Occupational History    Not on file   Social Needs    Financial resource strain: Not on file    Food insecurity:     Worry: Not on file     Inability: Not on file    Transportation needs:     Medical: Not on file     Non-medical: Not on file   Tobacco Use    Smoking status: Current Every Day Smoker     Packs/day: 2.00     Years: 50.00     Pack years: 100.00     Types: Cigarettes    Smokeless tobacco: Never Used   Substance and Sexual Activity    Alcohol use: Not Currently    Drug use: Never    Sexual activity: Not Currently     Partners: Male     Birth control/protection: Surgical, Post-menopausal   Lifestyle    Physical activity:     Days per week: Not on file     Minutes per session: Not on file    Stress: Only a little   Relationships    Social connections:     Talks on phone: Not on file     Gets together: Not on file     Attends Orthodox service: Not on file     Active member of club or organization: Not on file     Attends meetings of clubs or organizations: Not on file     Relationship status: Not on file   Other Topics Concern    Not on file   Social History Narrative    Not on file      Review of Systems   Constitutional: Positive for fatigue and unexpected weight change. Negative for chills and fever.   HENT: Negative for congestion.    Eyes: Negative for visual disturbance.   Respiratory: Positive for cough, shortness of breath and wheezing.    Cardiovascular: Negative for chest pain and palpitations.   Gastrointestinal: Positive for diarrhea. Negative for abdominal pain, blood in stool and nausea.   Endocrine: Negative for cold intolerance and heat intolerance.   Genitourinary: Negative.  Negative for dysuria.   Musculoskeletal: Negative for back  "pain and myalgias.   Skin: Negative for rash.   Allergic/Immunologic: Negative for environmental allergies.   Neurological: Negative for dizziness, syncope and headaches.   Hematological: Does not bruise/bleed easily.   Psychiatric/Behavioral: Positive for dysphoric mood and sleep disturbance. Negative for suicidal ideas. The patient is nervous/anxious.          Objective:     Body mass index is 37.07 kg/m².  /65   Pulse 79   Temp 98 °F (36.7 °C) (Oral)   Resp 17   Ht 5' 1" (1.549 m)   Wt 89 kg (196 lb 3.2 oz)   SpO2 97%   BMI 37.07 kg/m²       Physical Exam   Constitutional: She is oriented to person, place, and time. She appears well-developed and well-nourished. No distress.   Continues to lose weight, but in good spirits today   HENT:   Head: Normocephalic.   Mouth/Throat: Oropharynx is clear and moist.   Eyes: Conjunctivae are normal.   Cardiovascular: Normal rate, regular rhythm, normal heart sounds and intact distal pulses.   No murmur heard.  Pulmonary/Chest: Effort normal. She has wheezes. She has rales.   Lungs coarse with wet coutgh   Abdominal: Soft. Bowel sounds are normal.   Musculoskeletal: She exhibits no tenderness.   Lymphadenopathy:     She has no cervical adenopathy.   Neurological: She is alert and oriented to person, place, and time. No sensory deficit.   Skin: Skin is warm and dry. Capillary refill takes 2 to 3 seconds. She is not diaphoretic.   Psychiatric: She has a normal mood and affect. Her behavior is normal.   Nursing note and vitals reviewed.      Lab Visit on 12/06/2019   Component Date Value Ref Range Status    WBC 12/06/2019 9.10  3.90 - 12.70 K/uL Final    RBC 12/06/2019 4.60  4.00 - 5.40 M/uL Final    Hemoglobin 12/06/2019 14.1  12.0 - 16.0 g/dL Final    Hematocrit 12/06/2019 44.6  37.0 - 48.5 % Final    Mean Corpuscular Volume 12/06/2019 97  82 - 98 fL Final    Mean Corpuscular Hemoglobin 12/06/2019 30.7  27.0 - 31.0 pg Final    Mean Corpuscular Hemoglobin " Conc 12/06/2019 31.6* 32.0 - 36.0 g/dL Final    RDW 12/06/2019 13.3  11.5 - 14.5 % Final    Platelets 12/06/2019 232  150 - 350 K/uL Final    MPV 12/06/2019 11.4  9.2 - 12.9 fL Final    Immature Granulocytes 12/06/2019 0.2  0.0 - 0.5 % Final    Gran # (ANC) 12/06/2019 4.6  1.8 - 7.7 K/uL Final    Immature Grans (Abs) 12/06/2019 0.02  0.00 - 0.04 K/uL Final    Comment: Mild elevation in immature granulocytes is non specific and   can be seen in a variety of conditions including stress response,   acute inflammation, trauma and pregnancy. Correlation with other   laboratory and clinical findings is essential.      Lymph # 12/06/2019 3.6  1.0 - 4.8 K/uL Final    Mono # 12/06/2019 0.5  0.3 - 1.0 K/uL Final    Eos # 12/06/2019 0.3  0.0 - 0.5 K/uL Final    Baso # 12/06/2019 0.07  0.00 - 0.20 K/uL Final    nRBC 12/06/2019 0  0 /100 WBC Final    Gran% 12/06/2019 50.6  38.0 - 73.0 % Final    Lymph% 12/06/2019 39.5  18.0 - 48.0 % Final    Mono% 12/06/2019 5.8  4.0 - 15.0 % Final    Eosinophil% 12/06/2019 3.1  0.0 - 8.0 % Final    Basophil% 12/06/2019 0.8  0.0 - 1.9 % Final    Differential Method 12/06/2019 Automated   Final    Sodium 12/06/2019 141  136 - 145 mmol/L Final    Potassium 12/06/2019 4.5  3.5 - 5.1 mmol/L Final    Chloride 12/06/2019 103  95 - 110 mmol/L Final    CO2 12/06/2019 26  23 - 29 mmol/L Final    Glucose 12/06/2019 107  70 - 110 mg/dL Final    BUN, Bld 12/06/2019 8  8 - 23 mg/dL Final    Creatinine 12/06/2019 0.7  0.5 - 1.4 mg/dL Final    Calcium 12/06/2019 10.1  8.7 - 10.5 mg/dL Final    Total Protein 12/06/2019 7.2  6.0 - 8.4 g/dL Final    Albumin 12/06/2019 4.0  3.5 - 5.2 g/dL Final    Total Bilirubin 12/06/2019 0.2  0.1 - 1.0 mg/dL Final    Comment: For infants and newborns, interpretation of results should be based  on gestational age, weight and in agreement with clinical  observations.  Premature Infant recommended reference ranges:  Up to 24 hours.............<8.0  mg/dL  Up to 48 hours............<12.0 mg/dL  3-5 days..................<15.0 mg/dL  6-29 days.................<15.0 mg/dL      Alkaline Phosphatase 12/06/2019 107  55 - 135 U/L Final    AST 12/06/2019 16  10 - 40 U/L Final    ALT 12/06/2019 17  10 - 44 U/L Final    Anion Gap 12/06/2019 12  8 - 16 mmol/L Final    eGFR if African American 12/06/2019 >60.0  >60 mL/min/1.73 m^2 Final    eGFR if non African American 12/06/2019 >60.0  >60 mL/min/1.73 m^2 Final    Comment: Calculation used to obtain the estimated glomerular filtration  rate (eGFR) is the CKD-EPI equation.       Magnesium 12/06/2019 1.6  1.6 - 2.6 mg/dL Final    TSH 12/06/2019 2.085  0.400 - 4.000 uIU/mL Final    Free T4 12/06/2019 0.94  0.71 - 1.51 ng/dL Final    Hemoglobin A1C 12/06/2019 5.9* 4.0 - 5.6 % Final    Comment: ADA Screening Guidelines:  5.7-6.4%  Consistent with prediabetes  >or=6.5%  Consistent with diabetes  High levels of fetal hemoglobin interfere with the HbA1C  assay. Heterozygous hemoglobin variants (HbS, HgC, etc)do  not significantly interfere with this assay.   However, presence of multiple variants may affect accuracy.      Estimated Avg Glucose 12/06/2019 123  68 - 131 mg/dL Final    Vitamin B-12 12/06/2019 336  210 - 950 pg/mL Final    Methlymalonic Acid 12/06/2019 0.24  <0.40 umol/L Final    Comment: If applicable, any drug confirmation testing reported  here was developed and the performance characteristics  determined by [facility] ([facility]). This   confirmation testing has not been cleared or approved  by the FDA. The laboratory is regulated under CLIA as  qualified to perform high-complexity testing. This test  is used for patient testing purposes. It should not be  regarded as investigational or for research.  Test performed at [facility],  [address], [place]  [address]     [phone]  Curtis Hale MD  - Medical Director     Lab Visit on 12/06/2019   Component Date Value Ref  Range Status    Microalbum.,U,Random 12/06/2019 5.0  ug/mL Final    Creatinine, Random Ur 12/06/2019 74.0  15.0 - 325.0 mg/dL Final    Comment: The random urine reference ranges provided were established   for 24 hour urine collections.  No reference ranges exist for  random urine specimens.  Correlate clinically.      Microalb Creat Ratio 12/06/2019 6.8  0.0 - 30.0 ug/mg Final     No results found in the last 24 hours.   Assessment:     Encounter Diagnoses   Name Primary?    Moderate episode of recurrent major depressive disorder Yes    Essential hypertension     Diarrhea, unspecified type     Type 2 diabetes mellitus with diabetic polyneuropathy, without long-term current use of insulin     Cigarette nicotine dependence with nicotine-induced disorder     Cough, persistent     Centrilobular emphysema     Shortness of breath     Unintentional weight loss     Encounter for screening mammogram for malignant neoplasm of breast         Plan:     #Weight loss, non-improving cough, diarrhea  -I am concerned about her continual weight decline.  She is depressed and is coming up on the anniversary of her daughter's death, which is very difficult every year.  Her blood pressure today is low so we are reducing and discontinuing the magnesium.  She has continued to have diarrhea.  I pressed further today about the importance of mammogram as she has had a cervical cancer screening that was negative and colonoscopy was a few years ago which we will revisit.  She is agreeable to get a CT chest to investigate as to why she is continuing to have worsening of symptoms.  She is compliant with her inhaler, but she continues to smoke.  I will see her back very closely in 1 month.  -prednisone course. Cheratussin nightly prn. Advised to not take with hydroxyzine. Last fill per  of pain medication on 12/13/19 #28. Stop promethazine dm- caused nausea.    #Lumbar spondylolisthesis  #Primary OA- right knee s/p right  TKA  -S/p S/p L4-L5 lumbar fusion pseudoarthrosis revision with rhBMP-2 on 5/9/19 with Dr. Perez & right TKA with Dr. Duarte on 10/9/19  -Now stable per Ortho    #Diarrhea, chronic   -TSH suppressed, therefore, synthroid stopped with no change in symptoms. Occurred long after metformin use.   -Discontinue Magnesium  -Will press further for investigation at next check. CMP normal     #Sympatomatic postsurgical menopause (S/p KIP & BSO)  -established with dr. Maurice  -tried paxil and gabapentin without success  - lyrica 100 mg BID (pain control, flashes controlled).  reviewed.  -Pap 2/15/19. Dr. Maurice. No malignancy. No reflex to HPV. Repeat in 5 yr.    #MDD, not at goal  #Insomnia, not at goal  -Previous agents: Lexapro, Wellbutrin, Zoloft, Prozac, Paxil, Cymbalta  -11/2019: started pristiq 50 mg daily. Doing better. Increase to 100 mg daily  -Seroquel not helping with sleep. Start Hydroxyzine 25 mg nightly, but may increase to 50 mg and take once daily for anxiety if needed. Counseled on sedation.    Depression Patient Health Questionnaire 12/20/2019 12/17/2019 11/21/2019   Over the last two weeks how often have you been bothered by little interest or pleasure in doing things 3 1 1   Over the last two weeks how often have you been bothered by feeling down, depressed or hopeless 3 1 3   PHQ-2 Total Score 6 2 4   Over the last two weeks how often have you been bothered by trouble falling or staying asleep, or sleeping too much 3 - 2   Over the last two weeks how often have you been bothered by feeling tired or having little energy 3 - 1   Over the last two weeks how often have you been bothered by a poor appetite or overeating 1 - 1   Over the last two weeks how often have you been bothered by feeling bad about yourself - or that you are a failure or have let yourself or your family down 0 - 2   Over the last two weeks how often have you been bothered by trouble concentrating on things, such as reading the  newspaper or watching television 0 - 1   Over the last two weeks how often have you been bothered by moving or speaking so slowly that other people could have noticed. Or the opposite - being so fidgety or restless that you have been moving around a lot more than usual. 0 - 0   Over the last two weeks how often have you been bothered by thoughts that you would be better off dead, or of hurting yourself 0 - 0   If you checked off any problems, how difficult have these problems made it for you to do your work, take care of things at home or get along with other people? Not difficult at all - Somewhat difficult   Total Score 13 - 11     #COPD, stable  PFT 1/11/18: FEV1/FEVC 54.59, FEV1 1  -Follows with Pulmonology  -Continue Trelegy daily  -Nebulizers q6H     #Subclinical hypothyroidism --> suppressed TSH --> now resolved  TSH 6.97, normal fT4 in 10/2018 --> 2/19: TSH 0.64, normal fT4  -Empirically treated with synthroid 100 mcg daily to assess response of fatigue, however, unchanged.  TSH checked on 9/13/19 due to diarrhea: 0.02. Synthroid stopped. TSH on 12/6/19: normal 2.085    #NIDDM2 c/b neuropathy  ha1c 7.1% in 2/2019 --> 6.2% on 6/19/19 --> 12/6/19: 5.9%  -Metformin 1,000 mg BID + Januvia 25 mg daily   -prot/cr ratio 5 in 10/12/18 --> 6.8 on 12/6/19  - lyrica 100 mg BID for neuropathy  -b12 level 519 on 10/12/18 --> 278 on 9/13/19 --> 336 + normal MMA on 12/6    #HTN- borderline low  -Lisinopril/HCTZ 20/12.5 mg daily. Stopped Lasix 40 mg daily at 10/8/18 visit (on HCTZ). Echo within normal limits in March 2017. Stopped Verapamil 240 mg daily.  -12/21/19, today: reduce lisinopril to 10/12.5 mg daily. Will likely need to reduce further. BP today 108/65.    #Nicotine dependence, cigarettes c/b COPD  -Declines cessation clinic referral, chantix, patches, and wellbutrin  -counseled on risks. Not ready to quit.  -getting ct chest    #Obesity- BMI 37.07  -4/2019: 227 pounds. 6/19: 209 pounds. 9/2019: 214 --> 11/21: 199  pounds  -Today, 12/21: 196  -I am starting to worry about her continued weight loss. She is happy to lose the weight, however, this has been progressing so pressed today about mammogram and getting ct chest.     #SURJIT, resolved   10/12/18: H/H 11.5/35, MCV 83.5, ferritin 14, iron 31  2/8/19: Iron 44, ferritin 25, H/H 12.9/37.9, MCV 87.1  -S/p IV iron infusion. Can not tolerate oral iron. Previously had restless legs and was treated with requip, but d/c  -Repeat studies improved in 10/2019 and stable 12/2019     #GERD  -Nexium restarted by ENT, but has not been taking    #HLD  -lipid panel 10/12/18: chol 156, tri 98, hdl 76, ldl 60  -lipid panel 9/13/19: chol 146, tri 143, hdl 46, ldl 71  -Continue Pravastatin 40 mg daily    #Vitamin D insufficiency   -Continue Vit D 800 units daily    Repeat level    #Multiple TIAs  -ASA 81 mg daily     #Osetopenia  -Dexa 5/2015 & 12/6/19. Osteopenia. T -1.2  -Vit D 23.6 on 10/12/18 --> 30 on 9/13/19    #Healthcare Maintenance  -Mammogram 3/2013. BIRADS2. Repeat. She agrees to get done.  -Influenza 9/13/19  -Pneumovax 10/2014.   -Shingrix UTD (11/15/18, 3/27/19)  -TDAP 7/31/19  -Colonoscopy 2016. Will get records.  -Hep C screening negative 10/12/18    1 month follow up. Has mychart now.     Kenzie Loza M.D.    Orders Placed This Encounter   Procedures    CT Chest With Contrast    Mammo Digital Screening Bilat      Medications Ordered This Encounter   Medications    desvenlafaxine succinate (PRISTIQ) 100 MG Tb24     Sig: Take 1 tablet (100 mg total) by mouth once daily.     Dispense:  30 tablet     Refill:  0    guaifenesin-codeine 100-10 mg/5 ml (TUSSI-ORGANIDIN NR)  mg/5 mL syrup     Sig: Take 10 mLs by mouth 4 (four) times daily as needed for Cough or Congestion (causes sedation).     Dispense:  118 mL     Refill:  0    hydrOXYzine HCl (ATARAX) 25 MG tablet     Sig: Take 1 tablet (25 mg total) by mouth As instructed (sleep 2 BID for anxiety). 1nightly for sleep  PRN. May increase to 2 pills if tolerating     Dispense:  90 tablet     Refill:  0    lisinopril-hydrochlorothiazide (PRINZIDE,ZESTORETIC) 10-12.5 mg per tablet     Sig: Take 1 tablet by mouth once daily.     Dispense:  30 tablet     Refill:  0    predniSONE (DELTASONE) 10 MG tablet     Sig: Take 4 daily for 7 days,2 daily for 5 days,1 daily for 2 days,stop     Dispense:  45 tablet     Refill:  0

## 2019-12-20 NOTE — PATIENT INSTRUCTIONS
1. Increase pristiq from 50 to 100 mg daily.    2. Start (after cold and cough syrup) hydroxyzine 25 mg at bedtime. Can increase to 50 mg after 3 days if needed. Can use once during the day for anxiety if needed.     3. STOP seroquel.    4. STOP magnesium    5. Changing old lisinopril 20/12.5 mg daily to 10/12.5 mg daily.    6. Prednisone course.    7. Cough syrup bedtime only. Not with lyrica.     8. Mammogram    9. Ct scan    1 month.    Kenzie mao

## 2019-12-20 NOTE — TELEPHONE ENCOUNTER
----- Message from Perry Pereira LPN sent at 12/20/2019  2:25 PM CST -----  Contact: Radiology      ----- Message -----  From: Edwina Olea RT  Sent: 12/20/2019   1:42 PM CST  To: Jose Arellano Staff    Patient would like to have their CT test done at [facility], could someone please start processing referral, and let us know when approved  Thanks,  Edwina

## 2019-12-21 PROBLEM — R79.89 LOW TSH LEVEL: Status: RESOLVED | Noted: 2019-11-21 | Resolved: 2019-12-21

## 2019-12-21 PROBLEM — E53.8 LOW SERUM VITAMIN B12: Status: RESOLVED | Noted: 2019-11-21 | Resolved: 2019-12-21

## 2019-12-21 PROBLEM — R63.4 UNINTENTIONAL WEIGHT LOSS: Status: ACTIVE | Noted: 2019-12-21

## 2019-12-21 PROBLEM — E87.1 HYPONATREMIA: Status: RESOLVED | Noted: 2019-11-21 | Resolved: 2019-12-21

## 2019-12-21 PROBLEM — E87.8 HYPOCHLOREMIA: Status: RESOLVED | Noted: 2019-11-21 | Resolved: 2019-12-21

## 2019-12-21 PROBLEM — E83.42 HYPOMAGNESEMIA: Status: RESOLVED | Noted: 2019-11-21 | Resolved: 2019-12-21

## 2020-01-09 ENCOUNTER — TELEPHONE (OUTPATIENT)
Dept: ADMINISTRATIVE | Facility: HOSPITAL | Age: 65
End: 2020-01-09

## 2020-01-09 NOTE — TELEPHONE ENCOUNTER
No answer and no VM box to leave a reminder message for the pt's scheduled mammogram appt on Friday, 1/10/20 in [place]. lw

## 2020-01-13 ENCOUNTER — PATIENT OUTREACH (OUTPATIENT)
Dept: ADMINISTRATIVE | Facility: HOSPITAL | Age: 65
End: 2020-01-13

## 2020-01-13 NOTE — PROGRESS NOTES
Health Maintenance reviewed. External labs from [facility] entered ordered by Dr. Loza.  Eye exam don at Dr. Marquez sent to [facility] for media upload.

## 2020-01-15 ENCOUNTER — PATIENT OUTREACH (OUTPATIENT)
Dept: ADMINISTRATIVE | Facility: HOSPITAL | Age: 65
End: 2020-01-15

## 2020-01-16 RX ORDER — LISINOPRIL AND HYDROCHLOROTHIAZIDE 12.5; 2 MG/1; MG/1
TABLET ORAL
COMMUNITY
Start: 2019-12-26 | End: 2020-01-31

## 2020-01-16 RX ORDER — HYDROCODONE BITARTRATE AND ACETAMINOPHEN 5; 325 MG/1; MG/1
TABLET ORAL
COMMUNITY
Start: 2019-12-31 | End: 2020-01-31

## 2020-01-16 RX ORDER — HYDROCODONE BITARTRATE AND ACETAMINOPHEN 5; 325 MG/1; MG/1
1 TABLET ORAL
COMMUNITY
Start: 2019-12-31 | End: 2020-01-31

## 2020-01-17 ENCOUNTER — HOSPITAL ENCOUNTER (OUTPATIENT)
Dept: RADIOLOGY | Facility: HOSPITAL | Age: 65
Discharge: HOME | End: 2020-01-17
Attending: INTERNAL MEDICINE
Payer: MEDICARE

## 2020-01-17 DIAGNOSIS — J43.2 CENTRILOBULAR EMPHYSEMA: ICD-10-CM

## 2020-01-17 DIAGNOSIS — F33.1 MODERATE EPISODE OF RECURRENT MAJOR DEPRESSIVE DISORDER: ICD-10-CM

## 2020-01-17 DIAGNOSIS — R63.4 UNINTENTIONAL WEIGHT LOSS: ICD-10-CM

## 2020-01-17 DIAGNOSIS — R06.02 SHORTNESS OF BREATH: ICD-10-CM

## 2020-01-17 DIAGNOSIS — R19.7 DIARRHEA, UNSPECIFIED TYPE: ICD-10-CM

## 2020-01-17 DIAGNOSIS — I10 ESSENTIAL HYPERTENSION: ICD-10-CM

## 2020-01-17 DIAGNOSIS — E11.42 TYPE 2 DIABETES MELLITUS WITH DIABETIC POLYNEUROPATHY, WITHOUT LONG-TERM CURRENT USE OF INSULIN: ICD-10-CM

## 2020-01-17 DIAGNOSIS — R05.3 COUGH, PERSISTENT: ICD-10-CM

## 2020-01-17 DIAGNOSIS — F17.219 CIGARETTE NICOTINE DEPENDENCE WITH NICOTINE-INDUCED DISORDER: ICD-10-CM

## 2020-01-17 DIAGNOSIS — Z12.31 ENCOUNTER FOR SCREENING MAMMOGRAM FOR MALIGNANT NEOPLASM OF BREAST: ICD-10-CM

## 2020-01-17 PROCEDURE — 25500020 PHARM REV CODE 255: Mod: PO | Performed by: INTERNAL MEDICINE

## 2020-01-17 PROCEDURE — 71260 CT THORAX DX C+: CPT | Mod: 26,,, | Performed by: RADIOLOGY

## 2020-01-17 PROCEDURE — 71260 CT THORAX DX C+: CPT | Mod: TC,PO

## 2020-01-17 PROCEDURE — 71260 CT CHEST WITH CONTRAST: ICD-10-PCS | Mod: 26,,, | Performed by: RADIOLOGY

## 2020-01-17 RX ADMIN — IOHEXOL 80 ML: 300 INJECTION, SOLUTION INTRAVENOUS at 09:01

## 2020-01-18 RX ORDER — SITAGLIPTIN 25 MG/1
TABLET, FILM COATED ORAL
Qty: 90 TABLET | Refills: 0 | Status: SHIPPED | OUTPATIENT
Start: 2020-01-18 | End: 2020-01-31 | Stop reason: SDUPTHER

## 2020-01-30 ENCOUNTER — PATIENT OUTREACH (OUTPATIENT)
Dept: ADMINISTRATIVE | Facility: HOSPITAL | Age: 65
End: 2020-01-30

## 2020-01-30 ENCOUNTER — TELEPHONE (OUTPATIENT)
Dept: ADMINISTRATIVE | Facility: HOSPITAL | Age: 65
End: 2020-01-30

## 2020-01-31 ENCOUNTER — HOSPITAL ENCOUNTER (OUTPATIENT)
Dept: RADIOLOGY | Facility: HOSPITAL | Age: 65
Discharge: HOME | End: 2020-01-31
Attending: INTERNAL MEDICINE
Payer: MEDICARE

## 2020-01-31 ENCOUNTER — OFFICE VISIT (OUTPATIENT)
Dept: FAMILY MEDICINE | Facility: CLINIC | Age: 65
End: 2020-01-31
Payer: MEDICARE

## 2020-01-31 VITALS — BODY MASS INDEX: 37.04 KG/M2 | HEIGHT: 61 IN | WEIGHT: 196.19 LBS

## 2020-01-31 VITALS
DIASTOLIC BLOOD PRESSURE: 62 MMHG | TEMPERATURE: 98 F | HEART RATE: 84 BPM | SYSTOLIC BLOOD PRESSURE: 105 MMHG | BODY MASS INDEX: 37.19 KG/M2 | WEIGHT: 197 LBS | HEIGHT: 61 IN

## 2020-01-31 DIAGNOSIS — Z13.29 SCREENING FOR THYROID DISORDER: ICD-10-CM

## 2020-01-31 DIAGNOSIS — R06.02 SHORTNESS OF BREATH: ICD-10-CM

## 2020-01-31 DIAGNOSIS — M43.16 SPONDYLOLISTHESIS OF LUMBAR REGION: Chronic | ICD-10-CM

## 2020-01-31 DIAGNOSIS — Z86.39 HISTORY OF IRON DEFICIENCY: ICD-10-CM

## 2020-01-31 DIAGNOSIS — F33.1 MODERATE EPISODE OF RECURRENT MAJOR DEPRESSIVE DISORDER: ICD-10-CM

## 2020-01-31 DIAGNOSIS — E66.01 CLASS 2 SEVERE OBESITY DUE TO EXCESS CALORIES WITH SERIOUS COMORBIDITY AND BODY MASS INDEX (BMI) OF 37.0 TO 37.9 IN ADULT: ICD-10-CM

## 2020-01-31 DIAGNOSIS — R63.4 UNINTENTIONAL WEIGHT LOSS: ICD-10-CM

## 2020-01-31 DIAGNOSIS — E53.8 LOW SERUM VITAMIN B12: ICD-10-CM

## 2020-01-31 DIAGNOSIS — J43.9 PULMONARY EMPHYSEMA, UNSPECIFIED EMPHYSEMA TYPE: ICD-10-CM

## 2020-01-31 DIAGNOSIS — Z13.89 SCREENING FOR HEMATURIA OR PROTEINURIA: ICD-10-CM

## 2020-01-31 DIAGNOSIS — J43.2 CENTRILOBULAR EMPHYSEMA: ICD-10-CM

## 2020-01-31 DIAGNOSIS — F17.219 CIGARETTE NICOTINE DEPENDENCE WITH NICOTINE-INDUCED DISORDER: Chronic | ICD-10-CM

## 2020-01-31 DIAGNOSIS — Z13.0 SCREENING FOR DEFICIENCY ANEMIA: ICD-10-CM

## 2020-01-31 DIAGNOSIS — E78.2 MIXED HYPERLIPIDEMIA: Chronic | ICD-10-CM

## 2020-01-31 DIAGNOSIS — Z13.21 ENCOUNTER FOR VITAMIN DEFICIENCY SCREENING: ICD-10-CM

## 2020-01-31 DIAGNOSIS — E11.42 TYPE 2 DIABETES MELLITUS WITH DIABETIC POLYNEUROPATHY, WITHOUT LONG-TERM CURRENT USE OF INSULIN: ICD-10-CM

## 2020-01-31 DIAGNOSIS — Z79.899 ENCOUNTER FOR LONG-TERM CURRENT USE OF MEDICATION: ICD-10-CM

## 2020-01-31 DIAGNOSIS — F17.219 CIGARETTE NICOTINE DEPENDENCE WITH NICOTINE-INDUCED DISORDER: ICD-10-CM

## 2020-01-31 DIAGNOSIS — Z11.4 SCREENING FOR HIV (HUMAN IMMUNODEFICIENCY VIRUS): ICD-10-CM

## 2020-01-31 DIAGNOSIS — R19.7 DIARRHEA, UNSPECIFIED TYPE: ICD-10-CM

## 2020-01-31 DIAGNOSIS — J30.9 CHRONIC ALLERGIC RHINITIS: Chronic | ICD-10-CM

## 2020-01-31 DIAGNOSIS — I10 ESSENTIAL HYPERTENSION: ICD-10-CM

## 2020-01-31 DIAGNOSIS — I10 ESSENTIAL HYPERTENSION: Primary | ICD-10-CM

## 2020-01-31 DIAGNOSIS — R05.3 COUGH, PERSISTENT: ICD-10-CM

## 2020-01-31 DIAGNOSIS — Z12.31 ENCOUNTER FOR SCREENING MAMMOGRAM FOR MALIGNANT NEOPLASM OF BREAST: ICD-10-CM

## 2020-01-31 PROBLEM — E89.41 SYMPTOMATIC POSTSURGICAL MENOPAUSE: Chronic | Status: RESOLVED | Noted: 2019-11-21 | Resolved: 2020-01-31

## 2020-01-31 PROCEDURE — 77063 MAMMO DIGITAL SCREENING BILAT WITH TOMOSYNTHESIS_CAD: ICD-10-PCS | Mod: 26,,, | Performed by: RADIOLOGY

## 2020-01-31 PROCEDURE — 77067 MAMMO DIGITAL SCREENING BILAT WITH TOMOSYNTHESIS_CAD: ICD-10-PCS | Mod: 26,,, | Performed by: RADIOLOGY

## 2020-01-31 PROCEDURE — 99999 PR PBB SHADOW E&M-EST. PATIENT-LVL III: ICD-10-PCS | Mod: PBBFAC,,, | Performed by: INTERNAL MEDICINE

## 2020-01-31 PROCEDURE — 99999 PR PBB SHADOW E&M-EST. PATIENT-LVL III: CPT | Mod: PBBFAC,,, | Performed by: INTERNAL MEDICINE

## 2020-01-31 PROCEDURE — 77063 BREAST TOMOSYNTHESIS BI: CPT | Mod: 26,,, | Performed by: RADIOLOGY

## 2020-01-31 PROCEDURE — 77067 SCR MAMMO BI INCL CAD: CPT | Mod: TC,PO

## 2020-01-31 PROCEDURE — 99214 PR OFFICE/OUTPT VISIT, EST, LEVL IV, 30-39 MIN: ICD-10-PCS | Mod: S$GLB,,, | Performed by: INTERNAL MEDICINE

## 2020-01-31 PROCEDURE — 77067 SCR MAMMO BI INCL CAD: CPT | Mod: 26,,, | Performed by: RADIOLOGY

## 2020-01-31 PROCEDURE — 99214 OFFICE O/P EST MOD 30 MIN: CPT | Mod: S$GLB,,, | Performed by: INTERNAL MEDICINE

## 2020-01-31 RX ORDER — IPRATROPIUM BROMIDE AND ALBUTEROL SULFATE 2.5; .5 MG/3ML; MG/3ML
3 SOLUTION RESPIRATORY (INHALATION) EVERY 6 HOURS PRN
Qty: 1 BOX | Refills: 6 | Status: SHIPPED | OUTPATIENT
Start: 2020-01-31 | End: 2020-02-14 | Stop reason: SDUPTHER

## 2020-01-31 RX ORDER — PRAVASTATIN SODIUM 40 MG/1
40 TABLET ORAL DAILY
Qty: 90 TABLET | Refills: 1 | Status: SHIPPED | OUTPATIENT
Start: 2020-01-31 | End: 2020-04-22

## 2020-01-31 RX ORDER — LEVOCETIRIZINE DIHYDROCHLORIDE 5 MG/1
5 TABLET, FILM COATED ORAL NIGHTLY
Qty: 90 TABLET | Refills: 1 | Status: SHIPPED | OUTPATIENT
Start: 2020-01-31 | End: 2020-03-22 | Stop reason: SDUPTHER

## 2020-01-31 RX ORDER — ALBUTEROL SULFATE 90 UG/1
2 AEROSOL, METERED RESPIRATORY (INHALATION) EVERY 6 HOURS PRN
Qty: 18 G | Refills: 6 | Status: SHIPPED | OUTPATIENT
Start: 2020-01-31 | End: 2020-08-03 | Stop reason: SDUPTHER

## 2020-01-31 RX ORDER — MONTELUKAST SODIUM 10 MG/1
10 TABLET ORAL NIGHTLY
Qty: 90 TABLET | Refills: 1 | Status: SHIPPED | OUTPATIENT
Start: 2020-01-31 | End: 2020-05-05

## 2020-01-31 RX ORDER — DESVENLAFAXINE 100 MG/1
100 TABLET, EXTENDED RELEASE ORAL DAILY
Qty: 90 TABLET | Refills: 1 | Status: SHIPPED | OUTPATIENT
Start: 2020-01-31 | End: 2020-05-05

## 2020-01-31 RX ORDER — LISINOPRIL 20 MG/1
20 TABLET ORAL DAILY
Qty: 90 TABLET | Refills: 0 | Status: SHIPPED | OUTPATIENT
Start: 2020-01-31 | End: 2020-05-05

## 2020-01-31 RX ORDER — HYDROXYZINE HYDROCHLORIDE 25 MG/1
25 TABLET, FILM COATED ORAL 2 TIMES DAILY
Qty: 180 TABLET | Refills: 1 | Status: SHIPPED | OUTPATIENT
Start: 2020-01-31 | End: 2020-08-20 | Stop reason: SDUPTHER

## 2020-01-31 RX ORDER — METFORMIN HYDROCHLORIDE 1000 MG/1
1000 TABLET ORAL 2 TIMES DAILY WITH MEALS
Qty: 180 TABLET | Refills: 1 | Status: SHIPPED | OUTPATIENT
Start: 2020-01-31 | End: 2020-05-05

## 2020-01-31 RX ORDER — LISINOPRIL 20 MG/1
20 TABLET ORAL DAILY
Qty: 30 TABLET | Refills: 0 | Status: SHIPPED | OUTPATIENT
Start: 2020-01-31 | End: 2020-03-01

## 2020-01-31 NOTE — PROGRESS NOTES
Subjective:      Patient ID: Fior Esparza is a 64 y.o. female.    Chief Complaint: Hypertension    HPI     64F here for follow up of cough and depression. Last seen 12/20/19.    #Depression, insomnia  -at the last visit Pristiq was increased to 100 mg daily and she was started on Hydroxyzine 25 mg, which she has been taking BID. She has been doing GREAT!!   -tomorrow is the anniversary of her daughter's death, which is always a very difficult time of the year for her, but she is optimistic about this year as she feels that her depression medications are finally stable.  She had would like to increase to 50 mg at bedtime if in agreement, which is reasonable.    #Diarrhea  -this has resolved since our last visit with discontinuing multiple medications  -her weight has also remained stable     #copd, allergies  -compliant with her inhalers  -feels that the Singulair is not helping with allergies.  We discussed adding daily Xyzal with it.  She does not want inhaled corticosteroids or nasal sprays.  Does not want to go to allergy    #hypertension  -her blood pressure was low at last visit and we had discussed reducing the lisinopril HCTZ down to 10/12.5 mg from 20/12.5 mg, however she did not get the new medication dose due to changing mail-order pharmacies, therefore has continued.  Her blood pressure is again low today at 105/62 and she does have some lightheadedness.  We discussed discontinuing the HCTZ and continuing the lisinopril 20.  Goal blood pressure will be 120/80 so if needed we can break the pill in half.    She is getting her mammogram done today.  Colonoscopy soon with GI, which is a repeat.  Labs scheduled for around March 10th and returned thereafter.  All medications into her new mail order pharmacy.    Review of patient's allergies indicates:   Allergen Reactions    Contrast media Hives, Shortness Of Breath, Itching and Swelling    Iodinated contrast media Hives, Itching, Shortness Of Breath and  Swelling       Current Outpatient Medications:     aspirin (ECOTRIN) 81 MG EC tablet, Take 81 mg by mouth once daily. , Disp: , Rfl:     blood sugar diagnostic Strp, 1 strip., Disp: , Rfl:     blood-glucose meter kit, Use as directed to check blood sugar qid., Disp: , Rfl:     desvenlafaxine succinate (PRISTIQ) 100 MG Tb24, Take 1 tablet (100 mg total) by mouth once daily., Disp: 90 tablet, Rfl: 1    hydrOXYzine HCl (ATARAX) 25 MG tablet, Take 1 tablet (25 mg total) by mouth 2 (two) times daily., Disp: 180 tablet, Rfl: 1    lancets Misc, 1 strip., Disp: , Rfl:     metFORMIN (GLUCOPHAGE) 1000 MG tablet, Take 1 tablet (1,000 mg total) by mouth 2 (two) times daily with meals., Disp: 180 tablet, Rfl: 1    pravastatin (PRAVACHOL) 40 MG tablet, Take 1 tablet (40 mg total) by mouth once daily., Disp: 90 tablet, Rfl: 1    pregabalin (LYRICA) 100 MG capsule, Take 1 capsule (100 mg total) by mouth 2 (two) times daily., Disp: 60 capsule, Rfl: 2    SITagliptin (JANUVIA) 25 MG Tab, Take 1 tablet (25 mg total) by mouth once daily., Disp: 90 tablet, Rfl: 1    albuterol (PROAIR HFA) 90 mcg/actuation inhaler, Inhale 2 puffs into the lungs every 6 (six) hours as needed for Wheezing., Disp: 18 g, Rfl: 6    albuterol-ipratropium (DUO-NEB) 2.5 mg-0.5 mg/3 mL nebulizer solution, Take 3 mLs by nebulization every 6 (six) hours as needed for Wheezing. Rescue, Disp: 1 Box, Rfl: 6    fluticasone-umeclidin-vilanter (TRELEGY ELLIPTA) 100-62.5-25 mcg DsDv, Inhale 1 puff into the lungs once daily., Disp: 180 each, Rfl: 1    levocetirizine (XYZAL) 5 MG tablet, Take 1 tablet (5 mg total) by mouth every evening., Disp: 90 tablet, Rfl: 1    lisinopril (PRINIVIL,ZESTRIL) 20 MG tablet, Take 1 tablet (20 mg total) by mouth once daily., Disp: 90 tablet, Rfl: 0    lisinopril (PRINIVIL,ZESTRIL) 20 MG tablet, Take 1 tablet (20 mg total) by mouth once daily., Disp: 30 tablet, Rfl: 0    montelukast (SINGULAIR) 10 mg tablet, Take 1 tablet (10  mg total) by mouth every evening., Disp: 90 tablet, Rfl: 1    Past Medical History:   Diagnosis Date    Chronic allergic rhinitis 11/21/2019    COPD with emphysema 11/21/2019    Essential hypertension 11/21/2019    Mixed hyperlipidemia 11/21/2019    Moderate episode of recurrent major depressive disorder 11/21/2019    Osteopenia of multiple sites  11/21/2019    Spondylolisthesis of lumbar region 11/21/2019    S/p l4-l5 fusion     Subclinical hypothyroidism     Symptomatic postsurgical menopause 11/21/2019    S/p KIP BSO    TIA (transient ischemic attack)     Type 2 diabetes mellitus with diabetic polyneuropathy, without long-term current use of insulin 11/21/2019     Past Surgical History:   Procedure Laterality Date    BACK SURGERY      CARPAL TUNNEL RELEASE Bilateral 04/17/2015, 11/7/14    dr. gomez    CATARACT EXTRACTION, BILATERAL  09/2018    CHOLECYSTECTOMY      EXTREME LATERAL INTERBODY FUSION (XLIF) OF SPINE  07/07/15, 5/9/19    elías    GANGLION CYST EXCISION Right     right wrist    TOTAL ABDOMINAL HYSTERECTOMY W/ BILATERAL SALPINGOOPHORECTOMY      TOTAL KNEE ARTHROPLASTY Right 10/2019    dr. covington (revision of prior)     Family History   Problem Relation Age of Onset    Rheum arthritis Mother     Diabetes Father     Breast cancer Maternal Aunt      Social History     Socioeconomic History    Marital status:      Spouse name: Not on file    Number of children: Not on file    Years of education: Not on file    Highest education level: Not on file   Occupational History    Not on file   Social Needs    Financial resource strain: Not on file    Food insecurity:     Worry: Not on file     Inability: Not on file    Transportation needs:     Medical: Not on file     Non-medical: Not on file   Tobacco Use    Smoking status: Current Every Day Smoker     Packs/day: 2.00     Years: 50.00     Pack years: 100.00     Types: Cigarettes    Smokeless tobacco: Never Used  "  Substance and Sexual Activity    Alcohol use: Not Currently    Drug use: Never    Sexual activity: Not Currently     Partners: Male     Birth control/protection: Surgical, Post-menopausal   Lifestyle    Physical activity:     Days per week: Not on file     Minutes per session: Not on file    Stress: Only a little   Relationships    Social connections:     Talks on phone: Not on file     Gets together: Not on file     Attends Denominational service: Not on file     Active member of club or organization: Not on file     Attends meetings of clubs or organizations: Not on file     Relationship status: Not on file   Other Topics Concern    Not on file   Social History Narrative    Not on file      Review of Systems   Constitutional: Positive for unexpected weight change. Negative for chills, fatigue and fever.   HENT: Negative for congestion.    Eyes: Negative for visual disturbance.   Respiratory: Positive for cough, shortness of breath and wheezing.    Cardiovascular: Negative for chest pain and palpitations.   Gastrointestinal: Negative for abdominal pain, blood in stool, diarrhea and nausea.   Endocrine: Negative for cold intolerance and heat intolerance.   Genitourinary: Negative.  Negative for dysuria.   Musculoskeletal: Negative for back pain and myalgias.   Skin: Negative for rash.   Allergic/Immunologic: Positive for environmental allergies.   Neurological: Negative for dizziness, syncope and headaches.   Hematological: Does not bruise/bleed easily.   Psychiatric/Behavioral: Positive for sleep disturbance. Negative for dysphoric mood and suicidal ideas. The patient is not nervous/anxious.          Objective:     Body mass index is 37.22 kg/m².  /62   Pulse 84   Temp 97.8 °F (36.6 °C)   Ht 5' 1" (1.549 m)   Wt 89.4 kg (197 lb)   BMI 37.22 kg/m²       Physical Exam   Constitutional: She is oriented to person, place, and time. She appears well-developed and well-nourished. No distress.   Very happy " today!   HENT:   Head: Normocephalic.   Mouth/Throat: Oropharynx is clear and moist.   Eyes: Conjunctivae are normal.   Cardiovascular: Normal rate, regular rhythm, normal heart sounds and intact distal pulses.   No murmur heard.  Pulses:       Dorsalis pedis pulses are 2+ on the right side, and 2+ on the left side.        Posterior tibial pulses are 2+ on the right side, and 2+ on the left side.   Pulmonary/Chest: Effort normal. She has wheezes. She has no rales.   Abdominal: Soft. Bowel sounds are normal.   Musculoskeletal: She exhibits no tenderness.   Feet:   Right Foot:   Protective Sensation: 10 sites tested. 10 sites sensed.   Skin Integrity: Negative for ulcer, blister, skin breakdown, erythema, warmth, callus or dry skin.   Left Foot:   Protective Sensation: 10 sites tested. 10 sites sensed.   Skin Integrity: Negative for ulcer, blister, skin breakdown, erythema, warmth, callus or dry skin.   Lymphadenopathy:     She has no cervical adenopathy.   Neurological: She is alert and oriented to person, place, and time. No sensory deficit.   Skin: Skin is warm and dry. Capillary refill takes 2 to 3 seconds. She is not diaphoretic.   Psychiatric: She has a normal mood and affect. Her behavior is normal. Judgment and thought content normal.   Nursing note and vitals reviewed.      Patient Outreach on 01/13/2020   Component Date Value Ref Range Status    Cholesterol 09/13/2019 146  0 - 200 MG/DL Final    Triglycerides 09/13/2019 143  mg/dL Final    HDL 09/13/2019 46  mg/dL Final    LDL Cholesterol 09/13/2019 71  MG/DL Final    Hdl/Cholesterol Ratio 09/13/2019 3.2   Final    Hepatitis C Ab 10/12/2018 Negative   Final    NON REACTIVE    Lab Visit on 12/06/2019   Component Date Value Ref Range Status    WBC 12/06/2019 9.10  3.90 - 12.70 K/uL Final    RBC 12/06/2019 4.60  4.00 - 5.40 M/uL Final    Hemoglobin 12/06/2019 14.1  12.0 - 16.0 g/dL Final    Hematocrit 12/06/2019 44.6  37.0 - 48.5 % Final    Mean  Corpuscular Volume 12/06/2019 97  82 - 98 fL Final    Mean Corpuscular Hemoglobin 12/06/2019 30.7  27.0 - 31.0 pg Final    Mean Corpuscular Hemoglobin Conc 12/06/2019 31.6* 32.0 - 36.0 g/dL Final    RDW 12/06/2019 13.3  11.5 - 14.5 % Final    Platelets 12/06/2019 232  150 - 350 K/uL Final    MPV 12/06/2019 11.4  9.2 - 12.9 fL Final    Immature Granulocytes 12/06/2019 0.2  0.0 - 0.5 % Final    Gran # (ANC) 12/06/2019 4.6  1.8 - 7.7 K/uL Final    Immature Grans (Abs) 12/06/2019 0.02  0.00 - 0.04 K/uL Final    Comment: Mild elevation in immature granulocytes is non specific and   can be seen in a variety of conditions including stress response,   acute inflammation, trauma and pregnancy. Correlation with other   laboratory and clinical findings is essential.      Lymph # 12/06/2019 3.6  1.0 - 4.8 K/uL Final    Mono # 12/06/2019 0.5  0.3 - 1.0 K/uL Final    Eos # 12/06/2019 0.3  0.0 - 0.5 K/uL Final    Baso # 12/06/2019 0.07  0.00 - 0.20 K/uL Final    nRBC 12/06/2019 0  0 /100 WBC Final    Gran% 12/06/2019 50.6  38.0 - 73.0 % Final    Lymph% 12/06/2019 39.5  18.0 - 48.0 % Final    Mono% 12/06/2019 5.8  4.0 - 15.0 % Final    Eosinophil% 12/06/2019 3.1  0.0 - 8.0 % Final    Basophil% 12/06/2019 0.8  0.0 - 1.9 % Final    Differential Method 12/06/2019 Automated   Final    Sodium 12/06/2019 141  136 - 145 mmol/L Final    Potassium 12/06/2019 4.5  3.5 - 5.1 mmol/L Final    Chloride 12/06/2019 103  95 - 110 mmol/L Final    CO2 12/06/2019 26  23 - 29 mmol/L Final    Glucose 12/06/2019 107  70 - 110 mg/dL Final    BUN, Bld 12/06/2019 8  8 - 23 mg/dL Final    Creatinine 12/06/2019 0.7  0.5 - 1.4 mg/dL Final    Calcium 12/06/2019 10.1  8.7 - 10.5 mg/dL Final    Total Protein 12/06/2019 7.2  6.0 - 8.4 g/dL Final    Albumin 12/06/2019 4.0  3.5 - 5.2 g/dL Final    Total Bilirubin 12/06/2019 0.2  0.1 - 1.0 mg/dL Final    Comment: For infants and newborns, interpretation of results should be based  on  gestational age, weight and in agreement with clinical  observations.  Premature Infant recommended reference ranges:  Up to 24 hours.............<8.0 mg/dL  Up to 48 hours............<12.0 mg/dL  3-5 days..................<15.0 mg/dL  6-29 days.................<15.0 mg/dL      Alkaline Phosphatase 12/06/2019 107  55 - 135 U/L Final    AST 12/06/2019 16  10 - 40 U/L Final    ALT 12/06/2019 17  10 - 44 U/L Final    Anion Gap 12/06/2019 12  8 - 16 mmol/L Final    eGFR if African American 12/06/2019 >60.0  >60 mL/min/1.73 m^2 Final    eGFR if non African American 12/06/2019 >60.0  >60 mL/min/1.73 m^2 Final    Comment: Calculation used to obtain the estimated glomerular filtration  rate (eGFR) is the CKD-EPI equation.       Magnesium 12/06/2019 1.6  1.6 - 2.6 mg/dL Final    TSH 12/06/2019 2.085  0.400 - 4.000 uIU/mL Final    Free T4 12/06/2019 0.94  0.71 - 1.51 ng/dL Final    Hemoglobin A1C 12/06/2019 5.9* 4.0 - 5.6 % Final    Comment: ADA Screening Guidelines:  5.7-6.4%  Consistent with prediabetes  >or=6.5%  Consistent with diabetes  High levels of fetal hemoglobin interfere with the HbA1C  assay. Heterozygous hemoglobin variants (HbS, HgC, etc)do  not significantly interfere with this assay.   However, presence of multiple variants may affect accuracy.      Estimated Avg Glucose 12/06/2019 123  68 - 131 mg/dL Final    Vitamin B-12 12/06/2019 336  210 - 950 pg/mL Final    Methlymalonic Acid 12/06/2019 0.24  <0.40 umol/L Final    Comment: If applicable, any drug confirmation testing reported  here was developed and the performance characteristics  determined by [facility]. This   confirmation testing has not been cleared or approved  by the FDA. The laboratory is regulated under CLIA as  qualified to perform high-complexity testing. This test  is used for patient testing purposes. It should not be  regarded as investigational or for research.  Test performed at [facility],  [address], [place]  [address]     [phone]  Curtis Hale MD  - Medical Director     Lab Visit on 12/06/2019   Component Date Value Ref Range Status    Microalbum.,U,Random 12/06/2019 5.0  ug/mL Final    Creatinine, Random Ur 12/06/2019 74.0  15.0 - 325.0 mg/dL Final    Comment: The random urine reference ranges provided were established   for 24 hour urine collections.  No reference ranges exist for  random urine specimens.  Correlate clinically.      Microalb Creat Ratio 12/06/2019 6.8  0.0 - 30.0 ug/mg Final     No results found in the last 24 hours.   Assessment:     Encounter Diagnoses   Name Primary?    Essential hypertension Yes    Moderate episode of recurrent major depressive disorder     Type 2 diabetes mellitus with diabetic polyneuropathy, without long-term current use of insulin     Cigarette nicotine dependence with nicotine-induced disorder     Centrilobular emphysema     Mixed hyperlipidemia     Chronic allergic rhinitis     Class 2 severe obesity due to excess calories with serious comorbidity and body mass index (BMI) of 37.0 to 37.9 in adult     Screening for HIV (human immunodeficiency virus)     Screening for thyroid disorder     Screening for hematuria or proteinuria     Screening for deficiency anemia     Encounter for vitamin deficiency screening     Encounter for long-term current use of medication     Low serum vitamin B12     Pulmonary emphysema, unspecified emphysema type     Spondylolisthesis of lumbar region     History of iron deficiency         Plan:     #Lumbar spondylolisthesis  #Primary OA- right knee s/p right TKA  -S/p S/p L4-L5 lumbar fusion pseudoarthrosis revision with rhBMP-2 on 5/9/19 with Dr. Perez & right TKA with Dr. Duarte on 10/9/19  -Now stable per Ortho    #Diarrhea, resolved!  -TSH suppressed, therefore, synthroid stopped with no change in symptoms. Occurred long after metformin use. Discontinued Magnesium    #Sympatomatic  postsurgical menopause (S/p KIP & BSO)- resolved!  -established with dr. Maurice  -tried paxil and gabapentin without success  - lyrica 100 mg BID (pain control, flashes controlled).  reviewed.  -Pap 2/15/19. Dr. Maurice. No malignancy. No reflex to HPV. Repeat in 5 yr.    #MDD- at goal!!  #Insomnia, not at goal  -Previous agents: Lexapro, Wellbutrin, Zoloft, Prozac, Paxil, Cymbalta  -11/2019: started pristiq 50 mg daily. Increased to 100 mg daily. Seroquel not helping with sleep. Started Hydroxyzine 25 mg nightly, but may increase to 50 mg and take once daily for anxiety if needed. Counseled on sedation.  -today, 1/31: doing GREAT with pristiq 100 mg daily. Continue. For sleep increase hydroxyzine to 50 mg qhs and 25 during day (25 bid helping with anxiety)    #COPD, stable  PFT 1/11/18: FEV1/FEVC 54.59, FEV1 1  -Follows with Pulmonology  -Continue Trelegy daily  -Nebulizers q6H     #Subclinical hypothyroidism --> suppressed TSH --> now resolved  TSH 6.97, normal fT4 in 10/2018 --> 2/19: TSH 0.64, normal fT4  -Empirically treated with synthroid 100 mcg daily to assess response of fatigue, however, unchanged.  TSH checked on 9/13/19 due to diarrhea: 0.02. Synthroid stopped. TSH on 12/6/19: normal 2.085    #NIDDM2 c/b neuropathy  ha1c 7.1% in 2/2019 --> 6.2% on 6/19/19 --> 12/6/19: 5.9%  -Metformin 1,000 mg BID + Januvia 25 mg daily   -prot/cr ratio 5 in 10/12/18 --> 6.8 on 12/6/19  - lyrica 100 mg BID for neuropathy  -foot exam done today, 1/31 normal  -b12 level 519 on 10/12/18 --> 278 on 9/13/19 --> 336 + normal MMA on 12/6    #HTN-low  -Lisinopril/HCTZ 20/12.5 mg daily. Stopped Lasix 40 mg daily at 10/8/18 visit (on HCTZ). Echo within normal limits in March 2017. Stopped Verapamil 240 mg daily.  -12/21/19: reduce lisinopril to 10/12.5 mg daily.  /65. She did not get the med and continued prior dose.  -Today, 1/31. /62. STOP lisinopril/hctz 20/12.5 mg daily and START lisinopril 20 mg daily. Aim for  goal 120/80    #Nicotine dependence, cigarettes c/b COPD  -Declines cessation clinic referral, chantix, patches, and wellbutrin  -counseled on risks. Not ready to quit.  -ct chest 1/17/20: normal. No nodules or masses noted     #Obesity- BMI 37.07 --> 37.22   -4/2019: 227 pounds. 6/19: 209 pounds. 9/2019: 214 --> 11/21: 199 pounds  -12/21: 196 --> today, 1/31: 197    #SURJIT, resolved   10/12/18: H/H 11.5/35, MCV 83.5, ferritin 14, iron 31  2/8/19: Iron 44, ferritin 25, H/H 12.9/37.9, MCV 87.1  -S/p IV iron infusion. Can not tolerate oral iron. Previously had restless legs and was treated with requip, but d/c  -Repeat studies improved in 10/2019 and stable 12/2019     #GERD  -Nexium restarted by ENT, but has not been taking    #HLD  -lipid panel 10/12/18: chol 156, tri 98, hdl 76, ldl 60  -lipid panel 9/13/19: chol 146, tri 143, hdl 46, ldl 71  -Continue Pravastatin 40 mg daily    #Multiple TIAs  -ASA 81 mg daily     #Osetopenia  -Dexa 5/2015 & 12/6/19. Osteopenia. T -1.2  -Vit D 23.6 on 10/12/18 --> 30 on 9/13/19  -Continue Vit D 800 units daily     #Environmental allergies  -resume singulair + xyzal daily  -declines allergy referral    #Healthcare Maintenance  -Mammogram 3/2013. BIRADS2. Repeat today.  -Influenza 9/13/19  -Pneumovax 10/2014.   -Shingrix UTD (11/15/18, 3/27/19)  -TDAP 7/31/19  -Colonoscopy 2016. Will get records. Having repeat done soon ([facility])  -Hep C screening negative 10/12/18    Has maria luisa. 3 month follow up. Labs around march 10.      Kenzie Loza M.D.    Orders Placed This Encounter   Procedures    CBC auto differential    Comprehensive metabolic panel    Hemoglobin A1c    TSH    Magnesium    Iron and TIBC    Ferritin    Urinalysis    Vitamin B12    HIV 1/2 Ag/Ab (4th Gen)    Methylmalonic acid, serum    Lipase      Medications Ordered This Encounter   Medications    albuterol (PROAIR HFA) 90 mcg/actuation inhaler     Sig: Inhale 2 puffs into the lungs every 6 (six)  hours as needed for Wheezing.     Dispense:  18 g     Refill:  6    albuterol-ipratropium (DUO-NEB) 2.5 mg-0.5 mg/3 mL nebulizer solution     Sig: Take 3 mLs by nebulization every 6 (six) hours as needed for Wheezing. Rescue     Dispense:  1 Box     Refill:  6    desvenlafaxine succinate (PRISTIQ) 100 MG Tb24     Sig: Take 1 tablet (100 mg total) by mouth once daily.     Dispense:  90 tablet     Refill:  1    fluticasone-umeclidin-vilanter (TRELEGY ELLIPTA) 100-62.5-25 mcg DsDv     Sig: Inhale 1 puff into the lungs once daily.     Dispense:  180 each     Refill:  1    hydrOXYzine HCl (ATARAX) 25 MG tablet     Sig: Take 1 tablet (25 mg total) by mouth 2 (two) times daily.     Dispense:  180 tablet     Refill:  1    levocetirizine (XYZAL) 5 MG tablet     Sig: Take 1 tablet (5 mg total) by mouth every evening.     Dispense:  90 tablet     Refill:  1    lisinopril (PRINIVIL,ZESTRIL) 20 MG tablet     Sig: Take 1 tablet (20 mg total) by mouth once daily.     Dispense:  90 tablet     Refill:  0    lisinopril (PRINIVIL,ZESTRIL) 20 MG tablet     Sig: Take 1 tablet (20 mg total) by mouth once daily.     Dispense:  30 tablet     Refill:  0    metFORMIN (GLUCOPHAGE) 1000 MG tablet     Sig: Take 1 tablet (1,000 mg total) by mouth 2 (two) times daily with meals.     Dispense:  180 tablet     Refill:  1    montelukast (SINGULAIR) 10 mg tablet     Sig: Take 1 tablet (10 mg total) by mouth every evening.     Dispense:  90 tablet     Refill:  1    pravastatin (PRAVACHOL) 40 MG tablet     Sig: Take 1 tablet (40 mg total) by mouth once daily.     Dispense:  90 tablet     Refill:  1    SITagliptin (JANUVIA) 25 MG Tab     Sig: Take 1 tablet (25 mg total) by mouth once daily.     Dispense:  90 tablet     Refill:  1

## 2020-02-03 ENCOUNTER — PATIENT MESSAGE (OUTPATIENT)
Dept: FAMILY MEDICINE | Facility: CLINIC | Age: 65
End: 2020-02-03

## 2020-02-03 RX ORDER — METHYLPREDNISOLONE 4 MG/1
TABLET ORAL
Qty: 21 TABLET | Refills: 0 | Status: SHIPPED | OUTPATIENT
Start: 2020-02-03 | End: 2020-03-22

## 2020-02-05 ENCOUNTER — TELEPHONE (OUTPATIENT)
Dept: FAMILY MEDICINE | Facility: CLINIC | Age: 65
End: 2020-02-05

## 2020-02-05 NOTE — TELEPHONE ENCOUNTER
----- Message from Belia Huerta sent at 2/5/2020  4:40 PM CST -----  Contact: self [phone]  .Type:  Patient Returning Call    Who Called:Fior Lindsay  Who Left Message for Patient:  Does the patient know what this is regarding?:no  Would the patient rather a call back or a response via [employer]? Call back  Best Call Back Number:[phone]  Additional [employer]

## 2020-02-07 ENCOUNTER — PATIENT OUTREACH (OUTPATIENT)
Dept: ADMINISTRATIVE | Facility: HOSPITAL | Age: 65
End: 2020-02-07

## 2020-02-07 NOTE — LETTER
February 7, 2020        We are seeing Fior Esparza, 1955, at [facility]. Kenzie Loza MD is their primary care physician. To help with our [facility] maintenance records could you please send the following:     PATIENT LAST COLONOSCOPY REPORT    Please fax to [facility] at [phone], attention Reina Crockett LPN.    Thank-you in advance for your assistance. If you have any questions or concerns please contact me at [phone].     Reina Crockett LPN  Care Coordination Department  [facility]

## 2020-02-12 ENCOUNTER — PATIENT OUTREACH (OUTPATIENT)
Dept: ADMINISTRATIVE | Facility: HOSPITAL | Age: 65
End: 2020-02-12

## 2020-02-14 DIAGNOSIS — F33.1 MODERATE EPISODE OF RECURRENT MAJOR DEPRESSIVE DISORDER: ICD-10-CM

## 2020-02-14 DIAGNOSIS — E66.01 CLASS 2 SEVERE OBESITY DUE TO EXCESS CALORIES WITH SERIOUS COMORBIDITY AND BODY MASS INDEX (BMI) OF 37.0 TO 37.9 IN ADULT: ICD-10-CM

## 2020-02-14 DIAGNOSIS — J43.2 CENTRILOBULAR EMPHYSEMA: ICD-10-CM

## 2020-02-14 DIAGNOSIS — E11.42 TYPE 2 DIABETES MELLITUS WITH DIABETIC POLYNEUROPATHY, WITHOUT LONG-TERM CURRENT USE OF INSULIN: ICD-10-CM

## 2020-02-14 DIAGNOSIS — F17.219 CIGARETTE NICOTINE DEPENDENCE WITH NICOTINE-INDUCED DISORDER: Chronic | ICD-10-CM

## 2020-02-14 DIAGNOSIS — E78.2 MIXED HYPERLIPIDEMIA: Chronic | ICD-10-CM

## 2020-02-14 DIAGNOSIS — I10 ESSENTIAL HYPERTENSION: ICD-10-CM

## 2020-02-14 DIAGNOSIS — J30.9 CHRONIC ALLERGIC RHINITIS: Chronic | ICD-10-CM

## 2020-02-14 RX ORDER — IPRATROPIUM BROMIDE AND ALBUTEROL SULFATE 2.5; .5 MG/3ML; MG/3ML
3 SOLUTION RESPIRATORY (INHALATION) EVERY 6 HOURS PRN
Qty: 1 BOX | Refills: 6 | Status: SHIPPED | OUTPATIENT
Start: 2020-02-14 | End: 2020-08-03 | Stop reason: SDUPTHER

## 2020-02-14 NOTE — TELEPHONE ENCOUNTER
----- Message from Jacquelin Chirinos sent at 2/14/2020 11:29 AM CST -----  Type:  RX Refill Request    Who Called: Pt  Fior  Refill or New Rx:    New RX  RX Name and Strength:  Duoneb Solina 60/180mg  How is the patient currently taking it? (ex. 1XDay):    Is this a 30 day or 90 day RX:  Preferred Pharmacy with phone number:  [facility] in [place]  Local or Mail Order:  Local  Ordering Provider:  Dr Loza  Would the patient rather a call back or a response via [facility]?   Call back  Best Call Back Number:  [phone]  Additional Information:   States she is using a new pharmacy and they cannot fill this med//would like to  Use [facility] in [place]//please call//huan/sarah

## 2020-02-14 NOTE — TELEPHONE ENCOUNTER
Pt states the mail order pharmacy sent her a letter that they could not refill Rx (out of network). Pt requesting refill be sent to [facility] in [place].

## 2020-02-26 RX ORDER — ONDANSETRON HYDROCHLORIDE 8 MG/1
8 TABLET, FILM COATED ORAL EVERY 8 HOURS PRN
Qty: 30 TABLET | Refills: 0 | Status: SHIPPED | OUTPATIENT
Start: 2020-02-26 | End: 2020-03-27 | Stop reason: SDUPTHER

## 2020-02-26 NOTE — TELEPHONE ENCOUNTER
----- Message from Jeanna Jeffrey sent at 2/26/2020 12:52 PM CST -----  Contact: self [phone]  Type:  RX Refill Request    Who Called: Fior Esparza  Refill or New Rx:refill  RX Name and Strength:Zofran 8mg  How is the patient currently taking it? (ex. 1XDay): as needed  Is this a 30 day or 90 day RX: 90 day  Preferred Pharmacy with phone number:     [facility] [address] [facility]  [address] [address] [address]  Phone: [phone] Fax: [phone]    Local or Mail Order:local  Ordering Provider:Dago  Would the patient rather a call back or a response via [facility]? Call back   Best Call Back Number:[phone]  Additional Information:

## 2020-03-06 ENCOUNTER — TELEPHONE (OUTPATIENT)
Dept: FAMILY MEDICINE | Facility: CLINIC | Age: 65
End: 2020-03-06

## 2020-03-06 NOTE — TELEPHONE ENCOUNTER
"Returned phone call to patient. She c/o head cold symptoms and "pus pockets" in her throat. She requested to see Dr. Loza. Informed patient that Dr. Loza is out of clinic and advised her to schedule with NP. She declined at this time, only wanting to see PCP.   Encouraged her to call back and schedule appt to be seen if she changes her mind.  "

## 2020-03-06 NOTE — TELEPHONE ENCOUNTER
----- Message from Yojana Garcia sent at 3/6/2020  8:15 AM CST -----  Contact: pt  Type:  Needs Medical Advice    Who Called: the pt   Symptoms (please be specific): Head cold  How long has patient had these symptoms: n/a  Pharmacy name and phone #:  [facility]  Would the patient rather a call back or a response via [facility]? Call back  Best Call Back Number: [phone]  Additional Information: n/a

## 2020-03-16 ENCOUNTER — PATIENT MESSAGE (OUTPATIENT)
Dept: FAMILY MEDICINE | Facility: CLINIC | Age: 65
End: 2020-03-16

## 2020-03-22 ENCOUNTER — PATIENT MESSAGE (OUTPATIENT)
Dept: FAMILY MEDICINE | Facility: CLINIC | Age: 65
End: 2020-03-22

## 2020-03-22 DIAGNOSIS — J43.2 CENTRILOBULAR EMPHYSEMA: ICD-10-CM

## 2020-03-22 DIAGNOSIS — F33.1 MODERATE EPISODE OF RECURRENT MAJOR DEPRESSIVE DISORDER: ICD-10-CM

## 2020-03-22 DIAGNOSIS — F17.219 CIGARETTE NICOTINE DEPENDENCE WITH NICOTINE-INDUCED DISORDER: Chronic | ICD-10-CM

## 2020-03-22 DIAGNOSIS — E66.01 CLASS 2 SEVERE OBESITY DUE TO EXCESS CALORIES WITH SERIOUS COMORBIDITY AND BODY MASS INDEX (BMI) OF 37.0 TO 37.9 IN ADULT: ICD-10-CM

## 2020-03-22 DIAGNOSIS — J30.9 CHRONIC ALLERGIC RHINITIS: Chronic | ICD-10-CM

## 2020-03-22 DIAGNOSIS — I10 ESSENTIAL HYPERTENSION: ICD-10-CM

## 2020-03-22 DIAGNOSIS — E11.42 TYPE 2 DIABETES MELLITUS WITH DIABETIC POLYNEUROPATHY, WITHOUT LONG-TERM CURRENT USE OF INSULIN: ICD-10-CM

## 2020-03-22 DIAGNOSIS — E78.2 MIXED HYPERLIPIDEMIA: Chronic | ICD-10-CM

## 2020-03-22 RX ORDER — FLUTICASONE PROPIONATE 50 MCG
SPRAY, SUSPENSION (ML) NASAL
Qty: 16 G | Refills: 12 | Status: SHIPPED | OUTPATIENT
Start: 2020-03-22 | End: 2020-08-03 | Stop reason: SDUPTHER

## 2020-03-22 RX ORDER — MONTELUKAST SODIUM 10 MG/1
10 TABLET ORAL NIGHTLY
Qty: 90 TABLET | Refills: 1 | Status: SHIPPED | OUTPATIENT
Start: 2020-03-22 | End: 2020-04-21

## 2020-03-22 RX ORDER — LEVOCETIRIZINE DIHYDROCHLORIDE 5 MG/1
5 TABLET, FILM COATED ORAL NIGHTLY
Qty: 90 TABLET | Refills: 1 | Status: SHIPPED | OUTPATIENT
Start: 2020-03-22 | End: 2020-09-28 | Stop reason: SDUPTHER

## 2020-03-22 RX ORDER — AZELASTINE 1 MG/ML
2 SPRAY, METERED NASAL 2 TIMES DAILY
Qty: 30 ML | Refills: 2 | Status: SHIPPED | OUTPATIENT
Start: 2020-03-22 | End: 2021-01-05 | Stop reason: SDUPTHER

## 2020-03-22 RX ORDER — PROMETHAZINE HYDROCHLORIDE AND DEXTROMETHORPHAN HYDROBROMIDE 6.25; 15 MG/5ML; MG/5ML
5 SYRUP ORAL EVERY 6 HOURS PRN
Qty: 240 ML | Refills: 0 | Status: SHIPPED | OUTPATIENT
Start: 2020-03-22 | End: 2020-07-06

## 2020-03-22 NOTE — TELEPHONE ENCOUNTER
Alternate the flonase with astelin for nasal sprays and take the xyzal and singulair every day. Promethazine dm.

## 2020-03-23 ENCOUNTER — PATIENT MESSAGE (OUTPATIENT)
Dept: FAMILY MEDICINE | Facility: CLINIC | Age: 65
End: 2020-03-23

## 2020-03-27 RX ORDER — ONDANSETRON HYDROCHLORIDE 8 MG/1
8 TABLET, FILM COATED ORAL EVERY 8 HOURS PRN
Qty: 30 TABLET | Refills: 0 | Status: SHIPPED | OUTPATIENT
Start: 2020-03-27 | End: 2020-04-23 | Stop reason: SDUPTHER

## 2020-03-27 RX ORDER — PREGABALIN 100 MG/1
100 CAPSULE ORAL 2 TIMES DAILY
Qty: 60 CAPSULE | Refills: 3 | Status: SHIPPED | OUTPATIENT
Start: 2020-03-27 | End: 2020-07-27 | Stop reason: SDUPTHER

## 2020-03-27 NOTE — TELEPHONE ENCOUNTER
----- Message from Isatu Gerber sent at 3/27/2020 12:38 PM CDT -----  Contact: pt   Type:  RX Refill Request    Who Called:  Pt   Refill or New Rx: rfill   RX Name and Strength:zofran 8 mg   How is the patient currently taking it? (ex. 1XDay):as needed  Is this a 30 day or 90 day RX: 30 dys   Preferred Pharmacy with phone number:[facility]   Local or Mail Order: local   Ordering Provider:daylin   Would the patient rather a call back or a response via [facility]? Phone   Best Call Back Number: [phone]  Additional Information: discuss when she should come back in for her appt       [facility], [facility] [address] [facility] [address] [address] [address] [address]  Phone: [phone] Fax: [phone]

## 2020-03-27 NOTE — TELEPHONE ENCOUNTER
----- Message from Jaonne Maurice sent at 3/27/2020  1:22 PM CDT -----  Contact: pt  .Type:  RX Refill Request    Who Called: pt  Refill or New Rx:refill  RX Name and Strength:lyrica  How is the patient currently taking it? (ex. 1XDay):   Is this a 30 day or 90 day RX:   Preferred Pharmacy with phone number:    [email or web address] [place], [place] - [address] [facility]  [address] [address] [address]  Phone: [phone] Fax: [phone]     Local or Mail Order: local   Ordering Provider:   Would the patient rather a call back or a response via [facility]? Call back  Best Call Back Number:[phone] ([place])    Additional Information:

## 2020-03-27 NOTE — TELEPHONE ENCOUNTER
Patient called and notified of rx sent to pharmacy and rescheduling appointment for when PCP is back in clinic. Advised patient she may be seen by other providers in clinic if needed during that time.

## 2020-03-27 NOTE — TELEPHONE ENCOUNTER
reviewed with last fill on 2/28/20. No red flags. Receives tramadol from Orth[facility].     Kenzie Loza M.D.

## 2020-04-21 DIAGNOSIS — J43.2 CENTRILOBULAR EMPHYSEMA: ICD-10-CM

## 2020-04-21 DIAGNOSIS — J30.9 CHRONIC ALLERGIC RHINITIS: Chronic | ICD-10-CM

## 2020-04-21 DIAGNOSIS — F17.219 CIGARETTE NICOTINE DEPENDENCE WITH NICOTINE-INDUCED DISORDER: Chronic | ICD-10-CM

## 2020-04-21 DIAGNOSIS — E11.42 TYPE 2 DIABETES MELLITUS WITH DIABETIC POLYNEUROPATHY, WITHOUT LONG-TERM CURRENT USE OF INSULIN: ICD-10-CM

## 2020-04-21 DIAGNOSIS — F33.1 MODERATE EPISODE OF RECURRENT MAJOR DEPRESSIVE DISORDER: ICD-10-CM

## 2020-04-21 DIAGNOSIS — E66.01 CLASS 2 SEVERE OBESITY DUE TO EXCESS CALORIES WITH SERIOUS COMORBIDITY AND BODY MASS INDEX (BMI) OF 37.0 TO 37.9 IN ADULT: ICD-10-CM

## 2020-04-21 DIAGNOSIS — I10 ESSENTIAL HYPERTENSION: ICD-10-CM

## 2020-04-21 DIAGNOSIS — E78.2 MIXED HYPERLIPIDEMIA: Chronic | ICD-10-CM

## 2020-04-22 RX ORDER — PRAVASTATIN SODIUM 40 MG/1
TABLET ORAL
Qty: 90 TABLET | Refills: 0 | Status: SHIPPED | OUTPATIENT
Start: 2020-04-22 | End: 2020-05-05

## 2020-04-23 RX ORDER — ONDANSETRON HYDROCHLORIDE 8 MG/1
8 TABLET, FILM COATED ORAL EVERY 8 HOURS PRN
Qty: 30 TABLET | Refills: 0 | Status: SHIPPED | OUTPATIENT
Start: 2020-04-23 | End: 2020-05-21 | Stop reason: SDUPTHER

## 2020-04-23 NOTE — TELEPHONE ENCOUNTER
----- Message from Joanne Maurice sent at 4/23/2020  1:07 PM CDT -----  Contact: pt  .Type:  RX Refill Request    Who Called:  pt  Refill or New Rx: refill   RX Name and Strength: Zofran   How is the patient currently taking it? (ex. 1XDay):   Is this a 30 day or 90 day RX:  90 day   Preferred Pharmacy with phone number:       [facility] [place], [place] - [address] [facility]  [address] [address] [address]  Phone: [phone] Fax: [phone]     Local or Mail Order: local   Ordering Provider:   Would the patient rather a call back or a response via [facility]?  Call back   Best Call Back Number: [phone] ([place])  Additional Information:

## 2020-05-02 DIAGNOSIS — F17.219 CIGARETTE NICOTINE DEPENDENCE WITH NICOTINE-INDUCED DISORDER: Chronic | ICD-10-CM

## 2020-05-02 DIAGNOSIS — E11.42 TYPE 2 DIABETES MELLITUS WITH DIABETIC POLYNEUROPATHY, WITHOUT LONG-TERM CURRENT USE OF INSULIN: ICD-10-CM

## 2020-05-02 DIAGNOSIS — F33.1 MODERATE EPISODE OF RECURRENT MAJOR DEPRESSIVE DISORDER: ICD-10-CM

## 2020-05-02 DIAGNOSIS — J43.2 CENTRILOBULAR EMPHYSEMA: ICD-10-CM

## 2020-05-02 DIAGNOSIS — E66.01 CLASS 2 SEVERE OBESITY DUE TO EXCESS CALORIES WITH SERIOUS COMORBIDITY AND BODY MASS INDEX (BMI) OF 37.0 TO 37.9 IN ADULT: ICD-10-CM

## 2020-05-02 DIAGNOSIS — E78.2 MIXED HYPERLIPIDEMIA: Chronic | ICD-10-CM

## 2020-05-02 DIAGNOSIS — J30.9 CHRONIC ALLERGIC RHINITIS: Chronic | ICD-10-CM

## 2020-05-02 DIAGNOSIS — I10 ESSENTIAL HYPERTENSION: ICD-10-CM

## 2020-05-05 ENCOUNTER — TELEPHONE (OUTPATIENT)
Dept: FAMILY MEDICINE | Facility: CLINIC | Age: 65
End: 2020-05-05

## 2020-05-05 RX ORDER — PRAVASTATIN SODIUM 40 MG/1
TABLET ORAL
Qty: 90 TABLET | Refills: 0 | Status: SHIPPED | OUTPATIENT
Start: 2020-05-05 | End: 2020-08-03 | Stop reason: SDUPTHER

## 2020-05-05 RX ORDER — METFORMIN HYDROCHLORIDE 1000 MG/1
TABLET ORAL
Qty: 180 TABLET | Refills: 0 | Status: SHIPPED | OUTPATIENT
Start: 2020-05-05 | End: 2020-08-03 | Stop reason: SDUPTHER

## 2020-05-05 RX ORDER — SITAGLIPTIN 25 MG/1
TABLET, FILM COATED ORAL
Qty: 90 TABLET | Refills: 0 | Status: SHIPPED | OUTPATIENT
Start: 2020-05-05 | End: 2020-07-06 | Stop reason: SDUPTHER

## 2020-05-05 RX ORDER — DESVENLAFAXINE 100 MG/1
TABLET, EXTENDED RELEASE ORAL
Qty: 90 TABLET | Refills: 0 | Status: SHIPPED | OUTPATIENT
Start: 2020-05-05 | End: 2020-08-03 | Stop reason: SDUPTHER

## 2020-05-05 RX ORDER — MONTELUKAST SODIUM 10 MG/1
TABLET ORAL
Qty: 90 TABLET | Refills: 0 | Status: SHIPPED | OUTPATIENT
Start: 2020-05-05 | End: 2020-11-20 | Stop reason: SDUPTHER

## 2020-05-05 RX ORDER — LISINOPRIL 20 MG/1
TABLET ORAL
Qty: 90 TABLET | Refills: 0 | Status: SHIPPED | OUTPATIENT
Start: 2020-05-05 | End: 2020-08-03 | Stop reason: SDUPTHER

## 2020-05-05 NOTE — TELEPHONE ENCOUNTER
----- Message from Cande Luna sent at 5/5/2020 11:39 AM CDT -----  Contact: pt   Stated 3 faxes have been sent from [facility] about a refill on pt medication with no answer, she can be reached at [phone] Thanks

## 2020-05-08 ENCOUNTER — TELEPHONE (OUTPATIENT)
Dept: FAMILY MEDICINE | Facility: CLINIC | Age: 65
End: 2020-05-08

## 2020-05-08 NOTE — TELEPHONE ENCOUNTER
----- Message from Oralia Holcomb sent at 5/8/2020  9:55 AM CDT -----  ..Type:  Needs Medical Advice    Who Called: pt   Symptoms (please be specific): sinus infection   How long has patient had these symptoms: few day   Pharmacy name and phone #:  .  [facility] - [place], [place] - [address] [facility] [address]  [address] [address] [address]  Phone: [phone] Fax: [phone]    [facility] - [place], [place] - [address] [facility]  [address]  [facility] [address]  Phone: [phone] Fax: [phone]    Would the patient rather a call back or a response via [facility]? Call back   Best Call Back Number: [phone]  Additional Information: Pt is requesting a call to get a prescription for a cough and sinus infection

## 2020-05-08 NOTE — TELEPHONE ENCOUNTER
----- Message from Estephanie Strong sent at 5/8/2020 11:50 AM CDT -----  Contact: pt  Patient is calling in stating that the [facility] pharmacy has faxed over several refill request and have not gotten a response. Patient is down to her last four pills and needs her medication refilled. Please call back at [phone] (home) [phone] (work)

## 2020-05-21 ENCOUNTER — OFFICE VISIT (OUTPATIENT)
Dept: FAMILY MEDICINE | Facility: CLINIC | Age: 65
End: 2020-05-21
Payer: MEDICARE

## 2020-05-21 ENCOUNTER — TELEPHONE (OUTPATIENT)
Dept: FAMILY MEDICINE | Facility: CLINIC | Age: 65
End: 2020-05-21

## 2020-05-21 DIAGNOSIS — Z76.0 MEDICATION REFILL: ICD-10-CM

## 2020-05-21 DIAGNOSIS — R50.9 FEVER, UNSPECIFIED FEVER CAUSE: ICD-10-CM

## 2020-05-21 DIAGNOSIS — Z20.822 SUSPECTED COVID-19 VIRUS INFECTION: Primary | ICD-10-CM

## 2020-05-21 DIAGNOSIS — R05.9 COUGH: ICD-10-CM

## 2020-05-21 DIAGNOSIS — R06.02 SOB (SHORTNESS OF BREATH): ICD-10-CM

## 2020-05-21 PROCEDURE — U0003 INFECTIOUS AGENT DETECTION BY NUCLEIC ACID (DNA OR RNA); SEVERE ACUTE RESPIRATORY SYNDROME CORONAVIRUS 2 (SARS-COV-2) (CORONAVIRUS DISEASE [COVID-19]), AMPLIFIED PROBE TECHNIQUE, MAKING USE OF HIGH THROUGHPUT TECHNOLOGIES AS DESCRIBED BY CMS-2020-01-R: HCPCS

## 2020-05-21 PROCEDURE — 99441 PR PHYSICIAN TELEPHONE EVALUATION 5-10 MIN: ICD-10-PCS | Mod: 95,,, | Performed by: NURSE PRACTITIONER

## 2020-05-21 PROCEDURE — 99441 PR PHYSICIAN TELEPHONE EVALUATION 5-10 MIN: CPT | Mod: 95,,, | Performed by: NURSE PRACTITIONER

## 2020-05-21 RX ORDER — BENZONATATE 200 MG/1
200 CAPSULE ORAL 3 TIMES DAILY PRN
Qty: 30 CAPSULE | Refills: 0 | Status: SHIPPED | OUTPATIENT
Start: 2020-05-21 | End: 2020-05-31

## 2020-05-21 RX ORDER — ONDANSETRON HYDROCHLORIDE 8 MG/1
8 TABLET, FILM COATED ORAL EVERY 8 HOURS PRN
Qty: 30 TABLET | Refills: 0 | Status: SHIPPED | OUTPATIENT
Start: 2020-05-21 | End: 2020-06-29 | Stop reason: SDUPTHER

## 2020-05-21 NOTE — PROGRESS NOTES
Subjective:       Patient ID: Fior Esparza is a 65 y.o. female.    Chief Complaint: No chief complaint on file.  Audio Only Telehealth Visit     The patient location is: Pt's car; [place]  The chief complaint leading to consultation is: Fever, cough, SOB,   Visit type: Virtual visit with audio only (telephone)  Total time spent with patient: 10 min     The reason for the audio only service rather than synchronous audio and video virtual visit was related to technical difficulties or patient preference/necessity.     Each patient to whom I provide medical services by telemedicine is:  (1) informed of the relationship between the physician and patient and the respective role of any other health care provider with respect to management of the patient; and (2) notified that they may decline to receive medical services by telemedicine and may withdraw from such care at any time. Patient verbally consented to receive this service via voice-only telephone call.     This service was not originating from a related E/M service provided within the previous 7 days nor will  to an E/M service or procedure within the next 24 hours or my soonest available appointment.  Prevailing standard of care was able to be met in this audio-only visit.      Cough   This is a new problem. The current episode started in the past 7 days. The problem has been unchanged. The problem occurs every few minutes. The cough is productive of sputum. Associated symptoms include a fever, nasal congestion, rhinorrhea and shortness of breath. Pertinent negatives include no chest pain, chills, ear congestion, ear pain, headaches, heartburn, hemoptysis, myalgias, postnasal drip, rash, sore throat, sweats, weight loss or wheezing. Nothing aggravates the symptoms. She has tried nothing for the symptoms. The treatment provided no relief. Her past medical history is significant for COPD and emphysema. There is no history of asthma, bronchiectasis,  bronchitis, environmental allergies or pneumonia.     Past Medical History:   Diagnosis Date    Chronic allergic rhinitis 11/21/2019    COPD with emphysema 11/21/2019    Essential hypertension 11/21/2019    Mixed hyperlipidemia 11/21/2019    Moderate episode of recurrent major depressive disorder 11/21/2019    Osteopenia of multiple sites  11/21/2019    Spondylolisthesis of lumbar region 11/21/2019    S/p l4-l5 fusion     Subclinical hypothyroidism     Symptomatic postsurgical menopause 11/21/2019    S/p KIP BSO    TIA (transient ischemic attack)     Type 2 diabetes mellitus with diabetic polyneuropathy, without long-term current use of insulin 11/21/2019     Social History     Socioeconomic History    Marital status:      Spouse name: Not on file    Number of children: Not on file    Years of education: Not on file    Highest education level: Not on file   Occupational History    Not on file   Social Needs    Financial resource strain: Not on file    Food insecurity:     Worry: Not on file     Inability: Not on file    Transportation needs:     Medical: Not on file     Non-medical: Not on file   Tobacco Use    Smoking status: Current Every Day Smoker     Packs/day: 2.00     Years: 50.00     Pack years: 100.00     Types: Cigarettes    Smokeless tobacco: Never Used   Substance and Sexual Activity    Alcohol use: Not Currently    Drug use: Never    Sexual activity: Not Currently     Partners: Male     Birth control/protection: Surgical, Post-menopausal   Lifestyle    Physical activity:     Days per week: Not on file     Minutes per session: Not on file    Stress: Only a little   Relationships    Social connections:     Talks on phone: Not on file     Gets together: Not on file     Attends Orthodoxy service: Not on file     Active member of club or organization: Not on file     Attends meetings of clubs or organizations: Not on file     Relationship status: Not on file   Other Topics  Concern    Not on file   Social History Narrative    Not on file     Past Surgical History:   Procedure Laterality Date    BACK SURGERY      CARPAL TUNNEL RELEASE Bilateral 04/17/2015, 11/7/14    dr. gomez    CATARACT EXTRACTION, BILATERAL  09/2018    CHOLECYSTECTOMY      EXTREME LATERAL INTERBODY FUSION (XLIF) OF SPINE  07/07/15, 5/9/19    elías    GANGLION CYST EXCISION Right     right wrist    HYSTERECTOMY      TOTAL ABDOMINAL HYSTERECTOMY W/ BILATERAL SALPINGOOPHORECTOMY      TOTAL KNEE ARTHROPLASTY Right 10/2019    dr. covington (revision of prior)       Review of Systems   Constitutional: Positive for fever. Negative for chills and weight loss.   HENT: Positive for rhinorrhea. Negative for ear pain, postnasal drip and sore throat.    Eyes: Negative.    Respiratory: Positive for cough and shortness of breath. Negative for hemoptysis and wheezing.    Cardiovascular: Negative.  Negative for chest pain.   Gastrointestinal: Negative.  Negative for heartburn.   Endocrine: Negative.    Genitourinary: Negative.    Musculoskeletal: Negative.  Negative for myalgias.   Skin: Negative.  Negative for rash.   Allergic/Immunologic: Negative.  Negative for environmental allergies.   Neurological: Negative.  Negative for headaches.   Psychiatric/Behavioral: Negative.        Objective:      Physical Exam    Assessment:       1. Suspected Covid-19 Virus Infection    2. Fever, unspecified fever cause    3. Cough    4. SOB (shortness of breath)    5. Medication refill        Plan:           Diagnoses and all orders for this visit:    Suspected Covid-19 Virus Infection  Fever, unspecified fever cause  Cough  SOB (shortness of breath)  -     COVID-19 Routine Screening  -     benzonatate (TESSALON) 200 MG capsule; Take 1 capsule (200 mg total) by mouth 3 (three) times daily as needed.  Hydrate well  Tylenol OTC as directed  Inhalers as prescribed  COVID-19 home monitoring instructions given via AVS    Medication  refill  -     ondansetron (ZOFRAN) 8 MG tablet; Take 1 tablet (8 mg total) by mouth every 8 (eight) hours as needed for Nausea.    Report to ER immediately if symptoms worsen

## 2020-05-21 NOTE — TELEPHONE ENCOUNTER
----- Message from Janki Camacho sent at 5/21/2020 10:55 AM CDT -----  Contact: patient  Type:  Needs Medical Advice    Who Called: patient  Symptoms (please be specific): cough sinus infection   How long has patient had these symptoms:  yesterday  Pharmacy name and phone #:    [facility] [place], [place] - [address] [facility] [address]  [address] [address] [address]  Phone: [phone] Fax: [phone]      Would the patient rather a call back or a response via [facility]? call  Best Call Back Number: [phone]  Additional Information: please call

## 2020-05-21 NOTE — PATIENT INSTRUCTIONS
Hydrate well  Tylenol OTC as directed  Inhalers as prescribed  Report to ER immediately if symptoms worsen    Instructions for Patients with Confirmed or Suspected COVID-19    If you are awaiting your test result, you will either be called or it will be released to the patient portal.  If you have any questions about your test, please visit [email or web address] or call our COVID-19 information line at [phone].       Stay home and stay away from family members and friends. The CDC says, you can leave home after these three things have happened: 1) You have had no fever for at least 72 hours (that is three full days of no fever without the use of medicine that reduces fevers) 2) AND other symptoms have improved (for example, when your cough or shortness of breath have improved) 3) AND at least 7 days have passed since your symptoms first appeared.   Separate yourself from other people and animals in your home.   Call ahead before visiting your doctor.   Wear a facemask.   Cover your coughs and sneezes.   Wash your hands often with soap and water; hand  can be used, too.   Avoid sharing personal household items.   Wipe down surfaces used daily.   Monitor your symptoms. Seek prompt medical attention if your illness is worsening (e.g., difficulty breathing).    Before seeking care, call your healthcare provider.   If you have a medical emergency and need to call 911, notify the dispatch personnel that you have, or are being evaluated for COVID-19. If possible, put on a facemask before emergency medical services arrive.        Recommended precautions for household members, intimate partners, and caregivers in a home setting of a patient with symptomatic laboratory-confirmed COVID-19 or a patient under investigation.  Household members, intimate partners, and caregivers in the home setting awaiting tests results have close contact with a person with symptomatic, laboratory-confirmed  COVID-19 or a person under investigation. Close contacts should monitor their health; they should call their provider right away if they develop symptoms suggestive of COVID-19 (e.g., fever, cough, shortness of breath).    Close contacts should also follow these recommendations:   Make sure that you understand and can help the patient follow their provider's instructions for medication(s) and care. You should help the patient with basic needs in the home and provide support for getting groceries, prescriptions, and other personal needs.   Monitor the patient's symptoms. If the patient is getting sicker, call his or her healthcare provider and tell them that the patient has laboratory-confirmed COVID-19. If the patient has a medical emergency and you need to call 911, notify the dispatch personnel that the patient has, or is being evaluated for COVID-19.   Household members should stay in another room or be  from the patient. Household members should use a separate bedroom and bathroom, if available.   Prohibit visitors.   Household members should care for any pets in the home.   Make sure that shared spaces in the home have good air flow, such as by an air conditioner or an opened window, weather permitting.   Perform hand hygiene frequently. Wash your hands often with soap and water for at least 20 seconds or use an alcohol-based hand  (that contains > 60% alcohol) covering all surfaces of your hands and rubbing them together until they feel dry. Soap and water should be used preferentially.   Avoid touching your eyes, nose, and mouth.   The patient should wear a facemask. If the patient is not able to wear a facemask (for example, because it causes trouble breathing), caregivers should wear a mask when they are in the same room as the patient.   Wear a disposable facemask and gloves when you touch or have contact with the patient's blood, stool, or body fluids, such as saliva, sputum,  nasal mucus, vomit, urine.  o Throw out disposable facemasks and gloves after using them. Do not reuse.  o When removing personal protective equipment, first remove and dispose of gloves. Then, immediately clean your hands with soap and water or alcohol-based hand . Next, remove and dispose of facemask, and immediately clean your hands again with soap and water or alcohol-based hand .   You should not share dishes, drinking glasses, cups, eating utensils, towels, bedding, or other items with the patient. After the patient uses these items, you should wash them thoroughly (see below Wash laundry thoroughly).   Clean all high-touch surfaces, such as counters, tabletops, doorknobs, bathroom fixtures, toilets, phones, keyboards, tablets, and bedside tables, every day. Also, clean any surfaces that may have blood, stool, or body fluids on them.   Use a household cleaning spray or wipe, according to the label instructions. Labels contain instructions for safe and effective use of the cleaning product including precautions you should take when applying the product, such as wearing gloves and making sure you have good ventilation during use of the product.   Wash laundry thoroughly.  o Immediately remove and wash clothes or bedding that have blood, stool, or body fluids on them.  o Wear disposable gloves while handling soiled items and keep soiled items away from your body. Clean your hands (with soap and water or an alcohol-based hand ) immediately after removing your gloves.  o Read and follow directions on labels of laundry or clothing items and detergent. In general, using a normal laundry detergent according to washing machine instructions and dry thoroughly using the warmest temperatures recommended on the clothing label.   Place all used disposable gloves, facemasks, and other contaminated items in a lined container before disposing of them with other household waste. Clean your  hands (with soap and water or an alcohol-based hand ) immediately after handling these items. Soap and water should be used preferentially if hands are visibly dirty.   Discuss any additional questions with your state or local health department or healthcare provider. Check available hours when contacting your local health department.    For more information see CDC link below.      https://www.cdc.gov/coronavirus/2019-ncov/hcp/guidance-prevent-spread.html#precautions        Sources:  [facility], [facility] and [facility]

## 2020-05-22 ENCOUNTER — TELEPHONE (OUTPATIENT)
Dept: FAMILY MEDICINE | Facility: CLINIC | Age: 65
End: 2020-05-22

## 2020-05-22 DIAGNOSIS — R05.9 COUGH: Primary | ICD-10-CM

## 2020-05-22 LAB — SARS-COV-2 RNA RESP QL NAA+PROBE: NOT DETECTED

## 2020-05-22 RX ORDER — CEFDINIR 300 MG/1
300 CAPSULE ORAL 2 TIMES DAILY
Qty: 20 CAPSULE | Refills: 0 | Status: SHIPPED | OUTPATIENT
Start: 2020-05-22 | End: 2020-06-01

## 2020-06-11 ENCOUNTER — TELEPHONE (OUTPATIENT)
Dept: FAMILY MEDICINE | Facility: CLINIC | Age: 65
End: 2020-06-11

## 2020-06-11 NOTE — TELEPHONE ENCOUNTER
----- Message from Vianney Dennis sent at 6/11/2020 12:16 PM CDT -----  Contact: Patient  Patient would like to come in to see Dr. Loza, but the next available is coming up as 01/05/2021 due to her insurance. Please call to work patient in sooner at Ph .[phone].

## 2020-06-29 ENCOUNTER — LAB VISIT (OUTPATIENT)
Dept: LAB | Facility: HOSPITAL | Age: 65
End: 2020-06-29
Attending: INTERNAL MEDICINE
Payer: COMMERCIAL

## 2020-06-29 ENCOUNTER — OFFICE VISIT (OUTPATIENT)
Dept: FAMILY MEDICINE | Facility: CLINIC | Age: 65
End: 2020-06-29
Payer: COMMERCIAL

## 2020-06-29 VITALS
SYSTOLIC BLOOD PRESSURE: 125 MMHG | OXYGEN SATURATION: 96 % | TEMPERATURE: 98 F | DIASTOLIC BLOOD PRESSURE: 68 MMHG | HEART RATE: 94 BPM | BODY MASS INDEX: 40.84 KG/M2 | RESPIRATION RATE: 16 BRPM | HEIGHT: 60 IN | WEIGHT: 208 LBS

## 2020-06-29 DIAGNOSIS — J30.9 CHRONIC ALLERGIC RHINITIS: Chronic | ICD-10-CM

## 2020-06-29 DIAGNOSIS — F17.219 CIGARETTE NICOTINE DEPENDENCE WITH NICOTINE-INDUCED DISORDER: Chronic | ICD-10-CM

## 2020-06-29 DIAGNOSIS — J43.9 PULMONARY EMPHYSEMA, UNSPECIFIED EMPHYSEMA TYPE: ICD-10-CM

## 2020-06-29 DIAGNOSIS — J43.9 PULMONARY EMPHYSEMA, UNSPECIFIED EMPHYSEMA TYPE: Chronic | ICD-10-CM

## 2020-06-29 DIAGNOSIS — Z86.39 HISTORY OF IRON DEFICIENCY: ICD-10-CM

## 2020-06-29 DIAGNOSIS — M17.11 PRIMARY OSTEOARTHRITIS OF RIGHT KNEE: Chronic | ICD-10-CM

## 2020-06-29 DIAGNOSIS — F33.1 MODERATE EPISODE OF RECURRENT MAJOR DEPRESSIVE DISORDER: ICD-10-CM

## 2020-06-29 DIAGNOSIS — E11.42 TYPE 2 DIABETES MELLITUS WITH DIABETIC POLYNEUROPATHY, WITHOUT LONG-TERM CURRENT USE OF INSULIN: Primary | Chronic | ICD-10-CM

## 2020-06-29 DIAGNOSIS — Z13.21 ENCOUNTER FOR VITAMIN DEFICIENCY SCREENING: ICD-10-CM

## 2020-06-29 DIAGNOSIS — I10 ESSENTIAL HYPERTENSION: ICD-10-CM

## 2020-06-29 DIAGNOSIS — K21.9 GASTROESOPHAGEAL REFLUX DISEASE, ESOPHAGITIS PRESENCE NOT SPECIFIED: ICD-10-CM

## 2020-06-29 DIAGNOSIS — Z13.29 SCREENING FOR THYROID DISORDER: ICD-10-CM

## 2020-06-29 DIAGNOSIS — M19.011 LOCALIZED OSTEOARTHRITIS OF RIGHT SHOULDER: ICD-10-CM

## 2020-06-29 DIAGNOSIS — E66.01 CLASS 2 SEVERE OBESITY DUE TO EXCESS CALORIES WITH SERIOUS COMORBIDITY AND BODY MASS INDEX (BMI) OF 37.0 TO 37.9 IN ADULT: ICD-10-CM

## 2020-06-29 DIAGNOSIS — F33.1 MODERATE EPISODE OF RECURRENT MAJOR DEPRESSIVE DISORDER: Chronic | ICD-10-CM

## 2020-06-29 DIAGNOSIS — Z13.0 SCREENING FOR DEFICIENCY ANEMIA: ICD-10-CM

## 2020-06-29 DIAGNOSIS — Z79.899 ENCOUNTER FOR LONG-TERM CURRENT USE OF MEDICATION: ICD-10-CM

## 2020-06-29 DIAGNOSIS — E78.2 MIXED HYPERLIPIDEMIA: Chronic | ICD-10-CM

## 2020-06-29 DIAGNOSIS — M15.9 PRIMARY OSTEOARTHRITIS INVOLVING MULTIPLE JOINTS: ICD-10-CM

## 2020-06-29 DIAGNOSIS — J43.2 CENTRILOBULAR EMPHYSEMA: ICD-10-CM

## 2020-06-29 DIAGNOSIS — Z11.4 SCREENING FOR HIV (HUMAN IMMUNODEFICIENCY VIRUS): ICD-10-CM

## 2020-06-29 DIAGNOSIS — Z13.89 SCREENING FOR HEMATURIA OR PROTEINURIA: ICD-10-CM

## 2020-06-29 DIAGNOSIS — E66.01 MORBID OBESITY WITH BMI OF 40.0-44.9, ADULT: ICD-10-CM

## 2020-06-29 DIAGNOSIS — M43.16 SPONDYLOLISTHESIS OF LUMBAR REGION: Chronic | ICD-10-CM

## 2020-06-29 DIAGNOSIS — E53.8 LOW SERUM VITAMIN B12: ICD-10-CM

## 2020-06-29 DIAGNOSIS — I10 ESSENTIAL HYPERTENSION: Chronic | ICD-10-CM

## 2020-06-29 DIAGNOSIS — E11.42 TYPE 2 DIABETES MELLITUS WITH DIABETIC POLYNEUROPATHY, WITHOUT LONG-TERM CURRENT USE OF INSULIN: ICD-10-CM

## 2020-06-29 DIAGNOSIS — M85.89 OSTEOPENIA OF MULTIPLE SITES: Chronic | ICD-10-CM

## 2020-06-29 LAB
ALBUMIN SERPL BCP-MCNC: 4.3 G/DL (ref 3.5–5.2)
ALP SERPL-CCNC: 110 U/L (ref 55–135)
ALT SERPL W/O P-5'-P-CCNC: 24 U/L (ref 10–44)
ANION GAP SERPL CALC-SCNC: 11 MMOL/L (ref 8–16)
AST SERPL-CCNC: 22 U/L (ref 10–40)
BASOPHILS # BLD AUTO: 0.05 K/UL (ref 0–0.2)
BASOPHILS NFR BLD: 0.6 % (ref 0–1.9)
BILIRUB SERPL-MCNC: 0.2 MG/DL (ref 0.1–1)
BUN SERPL-MCNC: 14 MG/DL (ref 8–23)
CALCIUM SERPL-MCNC: 9.9 MG/DL (ref 8.7–10.5)
CHLORIDE SERPL-SCNC: 101 MMOL/L (ref 95–110)
CO2 SERPL-SCNC: 25 MMOL/L (ref 23–29)
CREAT SERPL-MCNC: 0.8 MG/DL (ref 0.5–1.4)
DIFFERENTIAL METHOD: NORMAL
EOSINOPHIL # BLD AUTO: 0.2 K/UL (ref 0–0.5)
EOSINOPHIL NFR BLD: 1.9 % (ref 0–8)
ERYTHROCYTE [DISTWIDTH] IN BLOOD BY AUTOMATED COUNT: 13.8 % (ref 11.5–14.5)
EST. GFR  (AFRICAN AMERICAN): >60 ML/MIN/1.73 M^2
EST. GFR  (NON AFRICAN AMERICAN): >60 ML/MIN/1.73 M^2
ESTIMATED AVG GLUCOSE: 128 MG/DL (ref 68–131)
FERRITIN SERPL-MCNC: 178 NG/ML (ref 20–300)
GLUCOSE SERPL-MCNC: 107 MG/DL (ref 70–110)
HBA1C MFR BLD HPLC: 6.1 % (ref 4–5.6)
HCT VFR BLD AUTO: 44.3 % (ref 37–48.5)
HGB BLD-MCNC: 14.4 G/DL (ref 12–16)
IMM GRANULOCYTES # BLD AUTO: 0.03 K/UL (ref 0–0.04)
IMM GRANULOCYTES NFR BLD AUTO: 0.4 % (ref 0–0.5)
IRON SERPL-MCNC: 74 UG/DL (ref 30–160)
LIPASE SERPL-CCNC: 44 U/L (ref 4–60)
LYMPHOCYTES # BLD AUTO: 3.7 K/UL (ref 1–4.8)
LYMPHOCYTES NFR BLD: 44.3 % (ref 18–48)
MAGNESIUM SERPL-MCNC: 2 MG/DL (ref 1.6–2.6)
MCH RBC QN AUTO: 29.8 PG (ref 27–31)
MCHC RBC AUTO-ENTMCNC: 32.5 G/DL (ref 32–36)
MCV RBC AUTO: 92 FL (ref 82–98)
MONOCYTES # BLD AUTO: 0.5 K/UL (ref 0.3–1)
MONOCYTES NFR BLD: 5.6 % (ref 4–15)
NEUTROPHILS # BLD AUTO: 4 K/UL (ref 1.8–7.7)
NEUTROPHILS NFR BLD: 47.2 % (ref 38–73)
NRBC BLD-RTO: 0 /100 WBC
PLATELET # BLD AUTO: 221 K/UL (ref 150–350)
PMV BLD AUTO: 10.7 FL (ref 9.2–12.9)
POTASSIUM SERPL-SCNC: 5.3 MMOL/L (ref 3.5–5.1)
PROT SERPL-MCNC: 8 G/DL (ref 6–8.4)
RBC # BLD AUTO: 4.83 M/UL (ref 4–5.4)
SATURATED IRON: 16 % (ref 20–50)
SODIUM SERPL-SCNC: 137 MMOL/L (ref 136–145)
TOTAL IRON BINDING CAPACITY: 462 UG/DL (ref 250–450)
TRANSFERRIN SERPL-MCNC: 312 MG/DL (ref 200–375)
TSH SERPL DL<=0.005 MIU/L-ACNC: 2.94 UIU/ML (ref 0.4–4)
VIT B12 SERPL-MCNC: 376 PG/ML (ref 210–950)
WBC # BLD AUTO: 8.36 K/UL (ref 3.9–12.7)

## 2020-06-29 PROCEDURE — 83690 ASSAY OF LIPASE: CPT

## 2020-06-29 PROCEDURE — 99214 OFFICE O/P EST MOD 30 MIN: CPT | Mod: PBBFAC,PO | Performed by: INTERNAL MEDICINE

## 2020-06-29 PROCEDURE — 80053 COMPREHEN METABOLIC PANEL: CPT

## 2020-06-29 PROCEDURE — 83735 ASSAY OF MAGNESIUM: CPT

## 2020-06-29 PROCEDURE — 83540 ASSAY OF IRON: CPT

## 2020-06-29 PROCEDURE — 83036 HEMOGLOBIN GLYCOSYLATED A1C: CPT

## 2020-06-29 PROCEDURE — 99214 OFFICE O/P EST MOD 30 MIN: CPT | Mod: S$PBB,,, | Performed by: INTERNAL MEDICINE

## 2020-06-29 PROCEDURE — 82728 ASSAY OF FERRITIN: CPT

## 2020-06-29 PROCEDURE — 85025 COMPLETE CBC W/AUTO DIFF WBC: CPT

## 2020-06-29 PROCEDURE — 86703 HIV-1/HIV-2 1 RESULT ANTBDY: CPT

## 2020-06-29 PROCEDURE — 84443 ASSAY THYROID STIM HORMONE: CPT

## 2020-06-29 PROCEDURE — 99214 PR OFFICE/OUTPT VISIT, EST, LEVL IV, 30-39 MIN: ICD-10-PCS | Mod: S$PBB,,, | Performed by: INTERNAL MEDICINE

## 2020-06-29 PROCEDURE — 83921 ORGANIC ACID SINGLE QUANT: CPT

## 2020-06-29 PROCEDURE — 82607 VITAMIN B-12: CPT

## 2020-06-29 PROCEDURE — 99999 PR PBB SHADOW E&M-EST. PATIENT-LVL IV: CPT | Mod: PBBFAC,,, | Performed by: INTERNAL MEDICINE

## 2020-06-29 PROCEDURE — 99999 PR PBB SHADOW E&M-EST. PATIENT-LVL IV: ICD-10-PCS | Mod: PBBFAC,,, | Performed by: INTERNAL MEDICINE

## 2020-06-29 PROCEDURE — 36415 COLL VENOUS BLD VENIPUNCTURE: CPT | Mod: PO

## 2020-06-29 RX ORDER — ESOMEPRAZOLE MAGNESIUM 40 MG/1
40 CAPSULE, DELAYED RELEASE ORAL
Qty: 90 CAPSULE | Refills: 1 | Status: SHIPPED | OUTPATIENT
Start: 2020-06-29 | End: 2020-08-03 | Stop reason: SDUPTHER

## 2020-06-29 RX ORDER — LANCETS
EACH MISCELLANEOUS
Qty: 200 EACH | Refills: 3 | Status: SHIPPED | OUTPATIENT
Start: 2020-06-29

## 2020-06-29 RX ORDER — CELECOXIB 200 MG/1
200 CAPSULE ORAL DAILY
Qty: 30 CAPSULE | Refills: 0 | Status: SHIPPED | OUTPATIENT
Start: 2020-06-29 | End: 2020-07-06 | Stop reason: SDUPTHER

## 2020-06-29 RX ORDER — ONDANSETRON HYDROCHLORIDE 8 MG/1
8 TABLET, FILM COATED ORAL EVERY 8 HOURS PRN
Qty: 30 TABLET | Refills: 3 | Status: SHIPPED | OUTPATIENT
Start: 2020-06-29 | End: 2021-01-05 | Stop reason: SDUPTHER

## 2020-06-29 NOTE — PROGRESS NOTES
Subjective:      Patient ID: Fior Esparza is a 65 y.o. female.    Chief Complaint: Diabetes    06/29/2020    HPI   65F here for follow up. The patient's last visit with me was on 1/31/2020.    #Depression, insomnia  -not at goal. Has been tough with covid.   -on pristiq 100 mg daily + hydroxyzine 25 mg BID.   -she is having trouble sleeping again. Hydroxyzine was working well in the past, but now with sleep cycle off she is having trouble falling asleep and staying asleep.   -she is getting back on [employer] on Wednesday and we will refer to get her on a good regimen.   -she's gained 11 pounds due to sleep dysregulation, depression, and lifestyle    #copd, allergies  -compliant with her inhalers    #Smoking  -not ready to quit    #GERD  -nexium worked previously- needs to resume     #Diabetes  -Am fasting   -taking januvia and metformin     #hypertension  -she has a blood pressure machine now with great values at home. Averaging 120/60-70s at home    Otherwise doing well    She is getting labs done today. We will plan to consider referral to psychology/pscyh to help with medication management. avoiding anything to add weight.    Review of patient's allergies indicates:   Allergen Reactions    Contrast media Hives, Shortness Of Breath, Itching and Swelling    Iodinated contrast media Hives, Itching, Shortness Of Breath and Swelling       Current Outpatient Medications:     albuterol (PROAIR HFA) 90 mcg/actuation inhaler, Inhale 2 puffs into the lungs every 6 (six) hours as needed for Wheezing., Disp: 18 g, Rfl: 6    albuterol-ipratropium (DUO-NEB) 2.5 mg-0.5 mg/3 mL nebulizer solution, Take 3 mLs by nebulization every 6 (six) hours as needed for Wheezing. Rescue, Disp: 1 Box, Rfl: 6    aspirin (ECOTRIN) 81 MG EC tablet, Take 81 mg by mouth once daily. , Disp: , Rfl:     azelastine (ASTELIN) 137 mcg (0.1 %) nasal spray, 2 sprays (274 mcg total) by Nasal route 2 (two) times daily., Disp: 30 mL, Rfl:  2    blood sugar diagnostic Strp, Use to test sugars 2-3 times daily, Disp: 200 each, Rfl: 3    blood-glucose meter kit, Use as directed to check blood sugar qid., Disp: , Rfl:     desvenlafaxine succinate (PRISTIQ) 100 MG Tb24, TAKE 1 TABLET ONCE DAILY, Disp: 90 tablet, Rfl: 0    fluticasone propionate (FLONASE) 50 mcg/actuation nasal spray, Use 2 puffs in each nostril q day., Disp: 16 g, Rfl: 12    fluticasone-umeclidin-vilanter (TRELEGY ELLIPTA) 100-62.5-25 mcg DsDv, Inhale 1 puff into the lungs once daily., Disp: 180 each, Rfl: 1    hydrOXYzine HCl (ATARAX) 25 MG tablet, Take 1 tablet (25 mg total) by mouth 2 (two) times daily., Disp: 180 tablet, Rfl: 1    JANUVIA 25 mg Tab, TAKE 1 TABLET ONCE DAILY, Disp: 90 tablet, Rfl: 0    lancets Misc, Use to test sugar 3 times daily, Disp: 200 each, Rfl: 3    levocetirizine (XYZAL) 5 MG tablet, Take 1 tablet (5 mg total) by mouth every evening., Disp: 90 tablet, Rfl: 1    lisinopriL (PRINIVIL,ZESTRIL) 20 MG tablet, TAKE 1 TABLET ONCE DAILY, Disp: 90 tablet, Rfl: 0    metFORMIN (GLUCOPHAGE) 1000 MG tablet, TAKE 1 TABLET TWICE DAILY  WITH MEALS, Disp: 180 tablet, Rfl: 0    montelukast (SINGULAIR) 10 mg tablet, TAKE 1 TABLET EVERY EVENING, Disp: 90 tablet, Rfl: 0    ondansetron (ZOFRAN) 8 MG tablet, Take 1 tablet (8 mg total) by mouth every 8 (eight) hours as needed for Nausea., Disp: 30 tablet, Rfl: 3    pravastatin (PRAVACHOL) 40 MG tablet, TAKE 1 TABLET ONCE DAILY, Disp: 90 tablet, Rfl: 0    pregabalin (LYRICA) 100 MG capsule, Take 1 capsule (100 mg total) by mouth 2 (two) times daily., Disp: 60 capsule, Rfl: 3    promethazine-dextromethorphan (PROMETHAZINE-DM) 6.25-15 mg/5 mL Syrp, Take 5 mLs by mouth every 6 (six) hours as needed (cough)., Disp: 240 mL, Rfl: 0    celecoxib (CELEBREX) 200 MG capsule, Take 1 capsule (200 mg total) by mouth once daily., Disp: 30 capsule, Rfl: 0    esomeprazole (NEXIUM) 40 MG capsule, Take 1 capsule (40 mg total) by mouth  before breakfast., Disp: 90 capsule, Rfl: 1    Past Medical History:   Diagnosis Date    Chronic allergic rhinitis 11/21/2019    COPD with emphysema 11/21/2019    Essential hypertension 11/21/2019    Localized osteoarthritis of right shoulder 6/29/2020    Low serum vitamin B12 11/21/2019    Mixed hyperlipidemia 11/21/2019    Moderate episode of recurrent major depressive disorder 11/21/2019    Osteopenia of multiple sites  11/21/2019    Spondylolisthesis of lumbar region 11/21/2019    S/p l4-l5 fusion     Subclinical hypothyroidism     Symptomatic postsurgical menopause 11/21/2019    S/p KIP BSO    TIA (transient ischemic attack)     Type 2 diabetes mellitus with diabetic polyneuropathy, without long-term current use of insulin 11/21/2019     Past Surgical History:   Procedure Laterality Date    BACK SURGERY      CARPAL TUNNEL RELEASE Bilateral 04/17/2015, 11/7/14    dr. gomez    CATARACT EXTRACTION, BILATERAL  09/2018    CHOLECYSTECTOMY      EXTREME LATERAL INTERBODY FUSION (XLIF) OF SPINE  07/07/15, 5/9/19    elías    GANGLION CYST EXCISION Right     right wrist    HYSTERECTOMY      TOTAL ABDOMINAL HYSTERECTOMY W/ BILATERAL SALPINGOOPHORECTOMY      TOTAL KNEE ARTHROPLASTY Right 10/2019    dr. covington (revision of prior)     Family History   Problem Relation Age of Onset    Rheum arthritis Mother     Diabetes Father     Breast cancer Maternal Aunt     Breast cancer Sister     Breast cancer Maternal Aunt      Social History     Socioeconomic History    Marital status:      Spouse name: Not on file    Number of children: Not on file    Years of education: Not on file    Highest education level: Not on file   Occupational History    Not on file   Social Needs    Financial resource strain: Not on file    Food insecurity     Worry: Not on file     Inability: Not on file    Transportation needs     Medical: Not on file     Non-medical: Not on file   Tobacco Use    Smoking  status: Former Smoker     Packs/day: 2.00     Years: 50.00     Pack years: 100.00     Types: Cigarettes     Quit date: 6/15/2020     Years since quittin.0    Smokeless tobacco: Never Used   Substance and Sexual Activity    Alcohol use: Not Currently    Drug use: Never    Sexual activity: Not Currently     Partners: Male     Birth control/protection: Surgical, Post-menopausal   Lifestyle    Physical activity     Days per week: Not on file     Minutes per session: Not on file    Stress: Only a little   Relationships    Social connections     Talks on phone: Not on file     Gets together: Not on file     Attends [employer] service: Not on file     Active member of club or organization: Not on file     Attends meetings of clubs or organizations: Not on file     Relationship status: Not on file   Other Topics Concern    Not on file   Social History Narrative    Not on file      Review of Systems   Constitutional: Positive for unexpected weight change. Negative for chills and fever.   HENT: Negative for congestion.    Eyes: Negative for visual disturbance.   Respiratory: Positive for cough, shortness of breath and wheezing.    Cardiovascular: Negative for palpitations.   Gastrointestinal: Negative for abdominal pain, blood in stool, diarrhea and nausea.   Endocrine: Negative for cold intolerance and heat intolerance.   Genitourinary: Negative.  Negative for dysuria.   Musculoskeletal: Negative for back pain and myalgias.   Skin: Negative for rash.   Allergic/Immunologic: Positive for environmental allergies.   Neurological: Negative for syncope.   Hematological: Does not bruise/bleed easily.   Psychiatric/Behavioral: Positive for sleep disturbance. Negative for dysphoric mood and suicidal ideas.         Objective:     Body mass index is 40.62 kg/m².  /68 (BP Location: Right arm, Patient Position: Sitting, BP Method: Medium (Automatic))   Pulse 94   Temp 97.8 °F (36.6 °C)   Resp 16   Ht 5' (1.524 m)    Wt 94.3 kg (208 lb)   SpO2 96%   BMI 40.62 kg/m²       Physical Exam  Vitals signs and nursing note reviewed.   Constitutional:       General: She is not in acute distress.     Appearance: She is well-developed. She is not diaphoretic.      Comments: Very happy today!   HENT:      Head: Normocephalic.   Eyes:      Conjunctiva/sclera: Conjunctivae normal.   Cardiovascular:      Rate and Rhythm: Normal rate and regular rhythm.      Heart sounds: Normal heart sounds. No murmur.   Pulmonary:      Effort: Pulmonary effort is normal.      Breath sounds: Wheezing present. No rales.   Abdominal:      General: Bowel sounds are normal.      Palpations: Abdomen is soft.   Musculoskeletal:         General: No tenderness.   Lymphadenopathy:      Cervical: No cervical adenopathy.   Skin:     General: Skin is warm and dry.      Capillary Refill: Capillary refill takes 2 to 3 seconds.   Neurological:      Mental Status: She is alert and oriented to person, place, and time.      Sensory: No sensory deficit.   Psychiatric:         Behavior: Behavior normal.         Thought Content: Thought content normal.         Judgment: Judgment normal.         Office Visit on 05/21/2020   Component Date Value Ref Range Status    SARS-CoV2 (COVID-19) Qualitative P* 05/21/2020 Not Detected  Not Detected Final    Comment: This test utilizes a real-time reverse transcription  polymerase chain reaction procedure to amplify and   detect the SARS-CoV-2 RdRp and N genes.    The analytical sensitivity (limit of detection) of   this assay is 100 copies/mL.   A Detected result is considered positive for COVID-19.  This patient is considered infected with the   SARS-CoV-2 virus and is presumed to be contagious.    A Not Detected result means that SARS-CoV-2 RNA is not  present above the limit of detection. It does not rule  out the possibility of COVID-19 and should not be the  sole basis for treatment decisions.  If COVID-19 is   strongly suspected  based on clinical and exposure   history,re-testing should be considered.    This test is only for use under Food and Drug   Administration s Emergency Use Authorization (EUA).   Commercial reagents are provided by [employer]  Performance characteristics of the EUA have been   independently verified by [facility]   Department of Pathology and L                           aboratory Medicine.       Patient Outreach on 01/13/2020   Component Date Value Ref Range Status    Cholesterol 09/13/2019 146  0 - 200 MG/DL Final    Triglycerides 09/13/2019 143  mg/dL Final    HDL 09/13/2019 46  mg/dL Final    LDL Cholesterol 09/13/2019 71  MG/DL Final    Hdl/Cholesterol Ratio 09/13/2019 3.2   Final    Hepatitis C Ab 10/12/2018 Negative   Final    NON REACTIVE      No results found in the last 24 hours.   Assessment:     Encounter Diagnoses   Name Primary?    Type 2 diabetes mellitus with diabetic polyneuropathy, without long-term current use of insulin Yes    Morbid obesity with BMI of 40.0-44.9, adult     Cigarette nicotine dependence with nicotine-induced disorder     Chronic allergic rhinitis     Primary osteoarthritis involving multiple joints     Osteopenia of multiple sites      Spondylolisthesis of lumbar region     Mixed hyperlipidemia     Essential hypertension     Pulmonary emphysema, unspecified emphysema type     Moderate episode of recurrent major depressive disorder     Gastroesophageal reflux disease, esophagitis presence not specified     Localized osteoarthritis of right shoulder     Primary osteoarthritis of right knee         Plan:     #Lumbar spondylolisthesis  #Primary OA- right knee s/p right TKA  -S/p S/p L4-L5 lumbar fusion pseudoarthrosis revision with rhBMP-2 on 5/9/19 with Dr. Perez & right TKA with Dr. Duarte on 10/9/19  -Now stable per Ortho    #Bilateral calcific tendinitis with right shoulder OA  -following with dr. Blanco (last seen 6/3/20. Injected.  Plan for PT    #Generalized OA  -trial of celebrex 200 mg daily    #Diarrhea, resolved!  -TSH suppressed, therefore, synthroid stopped with no change in symptoms. Occurred long after metformin use. Discontinued Magnesium    #Sympatomatic postsurgical menopause (S/p KIP & BSO)- resolved!  -established with dr. Maurice  -tried paxil and gabapentin without success  - lyrica 100 mg BID (pain control, flashes controlled).  reviewed. Last fill on 6/27/20  -Pap 2/15/19. Dr. Maurice. No malignancy. No reflex to HPV. Repeat in 5 yr.    #MDD- not at goal   #Insomnia, not at goal  -Previous agents: Lexapro, Wellbutrin, Zoloft, Prozac, Paxil, Cymbalta, Trazodone, Seroquel  -11/2019: started pristiq 50 mg daily. Increased to 100 mg daily. Seroquel not helping with sleep. Started Hydroxyzine 25 mg nightly, but may increase to 50 mg and take once daily for anxiety if needed. Counseled on sedation.  -today, 6/29: doing ok with pristiq 100 mg daily. Continue. For sleep increase hydroxyzine to 50 mg qhs and 25 during day (25 bid helping with anxiety)    #COPD, stable  PFT 1/11/18: FEV1/FEVC 54.59, FEV1 1  -Follows with Pulmonology  -Continue Trelegy daily  -Nebulizers q6H     #Subclinical hypothyroidism --> suppressed TSH --> now resolved  TSH 6.97, normal fT4 in 10/2018 --> 2/19: TSH 0.64, normal fT4  -Empirically treated with synthroid 100 mcg daily to assess response of fatigue, however, unchanged.  TSH checked on 9/13/19 due to diarrhea: 0.02. Synthroid stopped. TSH on 12/6/19: normal 2.085  -repeating    #NIDDM2 c/b neuropathy  ha1c 7.1% in 2/2019 --> 6.2% on 6/19/19 --> 12/6/19: 5.9%  -Metformin 1,000 mg BID + Januvia 25 mg daily   -prot/cr ratio 5 in 10/12/18 --> 6.8 on 12/6/19  - lyrica 100 mg BID for neuropathy  -foot exam done 1/31/20 normal  -b12 level 519 on 10/12/18 --> 278 on 9/13/19 --> 336 + normal MMA on 12/6    #HTN- BP today 125/68. controlled  -Lisinopril/HCTZ 20/12.5 mg daily. Stopped Lasix 40 mg daily at  10/8/18 visit (on HCTZ). Echo within normal limits in March 2017. Stopped Verapamil 240 mg daily.  -12/21/19: reduce lisinopril to 10/12.5 mg daily.  /65. She did not get the med and continued prior dose.  -1/31. /62. STOPPED lisinopril/hctz 20/12.5 mg daily and START lisinopril 20 mg daily. Aim for goal 120/80    #Nicotine dependence, cigarettes c/b COPD  -Declines cessation clinic referral, chantix, patches, and wellbutrin  -counseled on risks. Not ready to quit.  -ct chest 1/17/20: normal. No nodules or masses noted   -wellbutrin caused hallucinations.     #Obesity- BMI 37.07 --> 37.22  --> 40.62  -4/2019: 227 pounds. 6/19: 209 pounds. 9/2019: 214 --> 11/21: 199 pounds  -12/21: 196 --> 1/31: 197 --> today, 6/29: 208 lb    #SURJIT, resolved   10/12/18: H/H 11.5/35, MCV 83.5, ferritin 14, iron 31  2/8/19: Iron 44, ferritin 25, H/H 12.9/37.9, MCV 87.1  -S/p IV iron infusion. Can not tolerate oral iron. Previously had restless legs and was treated with requip, but d/c  -Repeat studies improved in 10/2019 and stable 12/2019     #GERD  -Nexium restarted by ENT, but has not been taking --> resume     #HLD  -lipid panel 10/12/18: chol 156, tri 98, hdl 76, ldl 60  -lipid panel 9/13/19: chol 146, tri 143, hdl 46, ldl 71  -Continue Pravastatin 40 mg daily  -repeat    #Multiple TIAs  -ASA 81 mg daily     #Osetopenia  -Dexa 5/2015 & 12/6/19. Osteopenia. T -1.2  -Vit D 23.6 on 10/12/18 --> 30 on 9/13/19  -Continue Vit D 800 units daily   -repeat dexa in 2 years    #Environmental allergies  -resume singulair + xyzal daily  -declines allergy referral    #Healthcare Maintenance  -Mammogram 1/31/20: birads 1. Repeat in 2 years.   -Influenza 9/13/19  -Pneumovax 10/2014.   -Shingrix UTD (11/15/18, 3/27/19)  -TDAP 7/31/19  -Colonoscopy 5/18/17. Will get records. Having repeat done soon ([facility])  -Hep C screening negative 10/12/18    Has mychart. 3 month follow up. Labs prior     Kenzie Loza M.D.    No orders of  the defined types were placed in this encounter.     Medications Ordered This Encounter   Medications    blood sugar diagnostic Strp     Sig: Use to test sugars 2-3 times daily     Dispense:  200 each     Refill:  3     ONETOUCH Verio Flex    celecoxib (CELEBREX) 200 MG capsule     Sig: Take 1 capsule (200 mg total) by mouth once daily.     Dispense:  30 capsule     Refill:  0    esomeprazole (NEXIUM) 40 MG capsule     Sig: Take 1 capsule (40 mg total) by mouth before breakfast.     Dispense:  90 capsule     Refill:  1    lancets Misc     Sig: Use to test sugar 3 times daily     Dispense:  200 each     Refill:  3     ONE TOUCH Verio Flex    ondansetron (ZOFRAN) 8 MG tablet     Sig: Take 1 tablet (8 mg total) by mouth every 8 (eight) hours as needed for Nausea.     Dispense:  30 tablet     Refill:  3

## 2020-06-30 LAB — HIV 1+2 AB+HIV1 P24 AG SERPL QL IA: NEGATIVE

## 2020-07-06 DIAGNOSIS — E66.01 CLASS 2 SEVERE OBESITY DUE TO EXCESS CALORIES WITH SERIOUS COMORBIDITY AND BODY MASS INDEX (BMI) OF 37.0 TO 37.9 IN ADULT: ICD-10-CM

## 2020-07-06 DIAGNOSIS — M19.011 LOCALIZED OSTEOARTHRITIS OF RIGHT SHOULDER: ICD-10-CM

## 2020-07-06 DIAGNOSIS — M17.11 PRIMARY OSTEOARTHRITIS OF RIGHT KNEE: Chronic | ICD-10-CM

## 2020-07-06 DIAGNOSIS — Z86.39 HISTORY OF VITAMIN D DEFICIENCY: ICD-10-CM

## 2020-07-06 DIAGNOSIS — M15.9 PRIMARY OSTEOARTHRITIS INVOLVING MULTIPLE JOINTS: ICD-10-CM

## 2020-07-06 DIAGNOSIS — F17.219 CIGARETTE NICOTINE DEPENDENCE WITH NICOTINE-INDUCED DISORDER: Chronic | ICD-10-CM

## 2020-07-06 DIAGNOSIS — E53.8 LOW SERUM VITAMIN B12: ICD-10-CM

## 2020-07-06 DIAGNOSIS — J43.2 CENTRILOBULAR EMPHYSEMA: ICD-10-CM

## 2020-07-06 DIAGNOSIS — E11.42 TYPE 2 DIABETES MELLITUS WITH DIABETIC POLYNEUROPATHY, WITHOUT LONG-TERM CURRENT USE OF INSULIN: Chronic | ICD-10-CM

## 2020-07-06 DIAGNOSIS — Z13.21 ENCOUNTER FOR VITAMIN DEFICIENCY SCREENING: ICD-10-CM

## 2020-07-06 DIAGNOSIS — Z13.89 SCREENING FOR HEMATURIA OR PROTEINURIA: ICD-10-CM

## 2020-07-06 DIAGNOSIS — E78.2 MIXED HYPERLIPIDEMIA: Chronic | ICD-10-CM

## 2020-07-06 DIAGNOSIS — Z79.899 ENCOUNTER FOR LONG-TERM CURRENT USE OF MEDICATION: ICD-10-CM

## 2020-07-06 DIAGNOSIS — J30.9 CHRONIC ALLERGIC RHINITIS: Chronic | ICD-10-CM

## 2020-07-06 DIAGNOSIS — M85.89 OSTEOPENIA OF MULTIPLE SITES: ICD-10-CM

## 2020-07-06 DIAGNOSIS — I10 ESSENTIAL HYPERTENSION: ICD-10-CM

## 2020-07-06 DIAGNOSIS — E87.5 HYPERKALEMIA: Primary | ICD-10-CM

## 2020-07-06 DIAGNOSIS — F33.1 MODERATE EPISODE OF RECURRENT MAJOR DEPRESSIVE DISORDER: ICD-10-CM

## 2020-07-06 LAB — METHYLMALONATE SERPL-SCNC: 0.59 UMOL/L

## 2020-07-06 RX ORDER — CELECOXIB 200 MG/1
200 CAPSULE ORAL DAILY
Qty: 90 CAPSULE | Refills: 1 | Status: SHIPPED | OUTPATIENT
Start: 2020-07-06 | End: 2020-08-03 | Stop reason: SDUPTHER

## 2020-07-06 RX ORDER — LANOLIN ALCOHOL/MO/W.PET/CERES
1000 CREAM (GRAM) TOPICAL DAILY
Qty: 90 TABLET | Refills: 1 | Status: SHIPPED | OUTPATIENT
Start: 2020-07-06

## 2020-07-07 ENCOUNTER — TELEPHONE (OUTPATIENT)
Dept: FAMILY MEDICINE | Facility: CLINIC | Age: 65
End: 2020-07-07

## 2020-07-07 DIAGNOSIS — M54.9 BACK PAIN, UNSPECIFIED BACK LOCATION, UNSPECIFIED BACK PAIN LATERALITY, UNSPECIFIED CHRONICITY: Primary | ICD-10-CM

## 2020-07-07 NOTE — TELEPHONE ENCOUNTER
----- Message from Isidro Gramajo sent at 7/7/2020  4:10 PM CDT -----  Regarding: Referral for Back pain  Type:  Patient Requesting Referral    Who Called: Pt   Does the patient already have the specialty appointment scheduled?: no  If yes, what is the date of that appointment?: no   Referral to What Specialty: pain management or orthopedics   Reason for Referral: severe back pain unable to walk standing up   Does the patient want the referral with a specific physician?: no   [facility]   Patient Requesting a Response?: yes   Would the patient rather a call back or a response via [facility]? Call back ]  Best Call Back Number: [phone] ([place])   Additional Information: n/a

## 2020-07-07 NOTE — TELEPHONE ENCOUNTER
Patient is requesting an internal referral to Orthopedics for her back pain. She was seen previously by Dr. Holt at [facility].

## 2020-07-13 PROBLEM — E53.8 B12 DEFICIENCY: Status: ACTIVE | Noted: 2020-07-13

## 2020-07-17 ENCOUNTER — TELEPHONE (OUTPATIENT)
Dept: FAMILY MEDICINE | Facility: CLINIC | Age: 65
End: 2020-07-17

## 2020-07-17 NOTE — TELEPHONE ENCOUNTER
----- Message from Maryjane Singh sent at 7/17/2020 11:04 AM CDT -----  Regarding: call for Anu  Contact: pt  Pt has not received her medication. Please advise. [phone]

## 2020-07-17 NOTE — TELEPHONE ENCOUNTER
Spoke with patient and she states [facility] told her they did not receive her RX's from 7/6/20. Spoke with pharmacy and they do have the RX's. Patient was notified by pharmacy.

## 2020-07-17 NOTE — TELEPHONE ENCOUNTER
Patient notified of medications sent to pharmacy on 07/06.   She was instructed to contact us if she has any issues picking up.

## 2020-07-27 RX ORDER — PREGABALIN 100 MG/1
100 CAPSULE ORAL 2 TIMES DAILY
Qty: 60 CAPSULE | Refills: 3 | Status: SHIPPED | OUTPATIENT
Start: 2020-07-27 | End: 2020-07-29 | Stop reason: SDUPTHER

## 2020-07-27 NOTE — TELEPHONE ENCOUNTER
----- Message from Violet Hay sent at 7/27/2020 10:14 AM CDT -----  Regarding: medication  Contact: patient  Patient needs to talk to nurse about medication and refill, but would not be more specific, please call her back at [phone]

## 2020-07-29 ENCOUNTER — TELEPHONE (OUTPATIENT)
Dept: FAMILY MEDICINE | Facility: CLINIC | Age: 65
End: 2020-07-29

## 2020-07-29 RX ORDER — PREGABALIN 100 MG/1
100 CAPSULE ORAL 2 TIMES DAILY
Qty: 60 CAPSULE | Refills: 3 | Status: CANCELLED | OUTPATIENT
Start: 2020-07-29

## 2020-07-29 RX ORDER — PREGABALIN 100 MG/1
100 CAPSULE ORAL 2 TIMES DAILY
Qty: 60 CAPSULE | Refills: 3 | Status: SHIPPED | OUTPATIENT
Start: 2020-07-29 | End: 2020-12-07 | Stop reason: SDUPTHER

## 2020-07-29 NOTE — TELEPHONE ENCOUNTER
----- Message from Onofre Sotelo sent at 7/29/2020  1:58 PM CDT -----  Regarding: Return Call  Contact: Pt  Type:  Patient Returning Call    Who Called:Fior Esparza  Who Left Message for Patient:  Does the patient know what this is regarding?:  Would the patient rather a call back or a response via [facility]? Call back  Best Call Back Number:[phone] (home) [phone] (work)  Additional Information:

## 2020-07-29 NOTE — TELEPHONE ENCOUNTER
Patient is currently out of her lyrica medication and is requesting short supply sent to local pharmacy until mail order arrives.    [facility].

## 2020-07-29 NOTE — TELEPHONE ENCOUNTER
----- Message from Amanda Milligan sent at 7/29/2020 11:11 AM CDT -----  Contact: self/[phone]  Would like to consult with nurse regarding medication. Please call back at [phone]. Thanks/ar

## 2020-07-29 NOTE — TELEPHONE ENCOUNTER
Patient states that the order will not arrive until 7-10 days after this Saturday.    She is requesting prescription to mail order be canceled and sent to [facility].

## 2020-08-03 DIAGNOSIS — F33.1 MODERATE EPISODE OF RECURRENT MAJOR DEPRESSIVE DISORDER: ICD-10-CM

## 2020-08-03 DIAGNOSIS — E78.2 MIXED HYPERLIPIDEMIA: Chronic | ICD-10-CM

## 2020-08-03 DIAGNOSIS — E66.01 CLASS 2 SEVERE OBESITY DUE TO EXCESS CALORIES WITH SERIOUS COMORBIDITY AND BODY MASS INDEX (BMI) OF 37.0 TO 37.9 IN ADULT: ICD-10-CM

## 2020-08-03 DIAGNOSIS — J30.9 CHRONIC ALLERGIC RHINITIS: Chronic | ICD-10-CM

## 2020-08-03 DIAGNOSIS — K21.9 GASTROESOPHAGEAL REFLUX DISEASE, ESOPHAGITIS PRESENCE NOT SPECIFIED: ICD-10-CM

## 2020-08-03 DIAGNOSIS — M19.011 LOCALIZED OSTEOARTHRITIS OF RIGHT SHOULDER: ICD-10-CM

## 2020-08-03 DIAGNOSIS — I10 ESSENTIAL HYPERTENSION: ICD-10-CM

## 2020-08-03 DIAGNOSIS — J43.2 CENTRILOBULAR EMPHYSEMA: ICD-10-CM

## 2020-08-03 DIAGNOSIS — M15.9 PRIMARY OSTEOARTHRITIS INVOLVING MULTIPLE JOINTS: ICD-10-CM

## 2020-08-03 DIAGNOSIS — M17.11 PRIMARY OSTEOARTHRITIS OF RIGHT KNEE: Chronic | ICD-10-CM

## 2020-08-03 DIAGNOSIS — F17.219 CIGARETTE NICOTINE DEPENDENCE WITH NICOTINE-INDUCED DISORDER: Chronic | ICD-10-CM

## 2020-08-03 DIAGNOSIS — E11.42 TYPE 2 DIABETES MELLITUS WITH DIABETIC POLYNEUROPATHY, WITHOUT LONG-TERM CURRENT USE OF INSULIN: Chronic | ICD-10-CM

## 2020-08-03 RX ORDER — PREGABALIN 100 MG/1
100 CAPSULE ORAL 2 TIMES DAILY
Qty: 180 CAPSULE | Refills: 3 | Status: CANCELLED | OUTPATIENT
Start: 2020-08-03

## 2020-08-03 NOTE — TELEPHONE ENCOUNTER
----- Message from Violet Hay sent at 8/3/2020  7:52 AM CDT -----  Regarding: meds  Contact: patient  Patient states that she has new insurance and needs to speak to nurse and give pharmacy information, please call her back at [phone]

## 2020-08-04 RX ORDER — CELECOXIB 200 MG/1
200 CAPSULE ORAL DAILY
Qty: 90 CAPSULE | Refills: 1 | Status: SHIPPED | OUTPATIENT
Start: 2020-08-04 | End: 2020-08-14

## 2020-08-04 RX ORDER — ALBUTEROL SULFATE 90 UG/1
2 AEROSOL, METERED RESPIRATORY (INHALATION) EVERY 6 HOURS PRN
Qty: 48 G | Refills: 3 | Status: SHIPPED | OUTPATIENT
Start: 2020-08-04 | End: 2021-08-04

## 2020-08-04 RX ORDER — PRAVASTATIN SODIUM 40 MG/1
40 TABLET ORAL DAILY
Qty: 90 TABLET | Refills: 1 | Status: SHIPPED | OUTPATIENT
Start: 2020-08-04 | End: 2020-12-20

## 2020-08-04 RX ORDER — LISINOPRIL 20 MG/1
20 TABLET ORAL DAILY
Qty: 90 TABLET | Refills: 1 | Status: SHIPPED | OUTPATIENT
Start: 2020-08-04 | End: 2020-12-20

## 2020-08-04 RX ORDER — METFORMIN HYDROCHLORIDE 1000 MG/1
1000 TABLET ORAL 2 TIMES DAILY WITH MEALS
Qty: 180 TABLET | Refills: 1 | Status: SHIPPED | OUTPATIENT
Start: 2020-08-04

## 2020-08-04 RX ORDER — FLUTICASONE PROPIONATE 50 MCG
SPRAY, SUSPENSION (ML) NASAL
Qty: 48 G | Refills: 1 | Status: SHIPPED | OUTPATIENT
Start: 2020-08-04 | End: 2021-01-05 | Stop reason: SDUPTHER

## 2020-08-04 RX ORDER — IPRATROPIUM BROMIDE AND ALBUTEROL SULFATE 2.5; .5 MG/3ML; MG/3ML
3 SOLUTION RESPIRATORY (INHALATION) EVERY 6 HOURS PRN
Qty: 1 BOX | Refills: 6 | Status: SHIPPED | OUTPATIENT
Start: 2020-08-04

## 2020-08-04 RX ORDER — DESVENLAFAXINE 100 MG/1
100 TABLET, EXTENDED RELEASE ORAL DAILY
Qty: 90 TABLET | Refills: 1 | Status: SHIPPED | OUTPATIENT
Start: 2020-08-04 | End: 2020-12-20

## 2020-08-04 RX ORDER — ESOMEPRAZOLE MAGNESIUM 40 MG/1
40 CAPSULE, DELAYED RELEASE ORAL
Qty: 90 CAPSULE | Refills: 1 | Status: SHIPPED | OUTPATIENT
Start: 2020-08-04 | End: 2020-09-28 | Stop reason: SDUPTHER

## 2020-08-04 NOTE — TELEPHONE ENCOUNTER
I will call pharmacy to delete refills, she needs to have all routine medication filled through mail order. They will place refills on hold until due to be filled

## 2020-08-04 NOTE — TELEPHONE ENCOUNTER
Some of these were just sent in July for 90 day supply to [facility]. Please either re-time or delete out medications.

## 2020-08-12 ENCOUNTER — TELEPHONE (OUTPATIENT)
Dept: FAMILY MEDICINE | Facility: CLINIC | Age: 65
End: 2020-08-12

## 2020-08-12 NOTE — TELEPHONE ENCOUNTER
----- Message from Marilu Hansen sent at 8/12/2020 10:51 AM CDT -----  Regarding: Medication  Pt is requesting call back in regards to questions about medication             Pls call back at [phone]

## 2020-08-12 NOTE — TELEPHONE ENCOUNTER
Patient states she is experiencing fluid retention and swelling in her extremities. She is requesting diuretic called in to [facility] as the swelling is very uncomfortable.

## 2020-08-12 NOTE — TELEPHONE ENCOUNTER
Patient states that she has swelling in legs every summer, patient states that she has been swelling since you took her off of lasix.

## 2020-08-12 NOTE — TELEPHONE ENCOUNTER
Please tell her we are doing a pre-op in 2 days. Last ultrasound of heart was in 2017. We may need to repeat. This is a new finding and may need cardiology evaluation. Advise her if she can not breath or is short of breath I want her to go to ER (she has COPD),but never has swelling in lower legs.

## 2020-08-14 ENCOUNTER — OFFICE VISIT (OUTPATIENT)
Dept: FAMILY MEDICINE | Facility: CLINIC | Age: 65
End: 2020-08-14
Payer: MEDICARE

## 2020-08-14 ENCOUNTER — HOSPITAL ENCOUNTER (OUTPATIENT)
Dept: RADIOLOGY | Facility: HOSPITAL | Age: 65
Discharge: HOME | End: 2020-08-14
Attending: INTERNAL MEDICINE
Payer: MEDICARE

## 2020-08-14 VITALS
HEIGHT: 60 IN | OXYGEN SATURATION: 93 % | SYSTOLIC BLOOD PRESSURE: 170 MMHG | TEMPERATURE: 98 F | DIASTOLIC BLOOD PRESSURE: 92 MMHG | WEIGHT: 221.19 LBS | HEART RATE: 93 BPM | BODY MASS INDEX: 43.43 KG/M2

## 2020-08-14 DIAGNOSIS — E11.42 TYPE 2 DIABETES MELLITUS WITH DIABETIC POLYNEUROPATHY, WITHOUT LONG-TERM CURRENT USE OF INSULIN: Chronic | ICD-10-CM

## 2020-08-14 DIAGNOSIS — I10 ESSENTIAL HYPERTENSION: ICD-10-CM

## 2020-08-14 DIAGNOSIS — M19.011 LOCALIZED OSTEOARTHRITIS OF RIGHT SHOULDER: ICD-10-CM

## 2020-08-14 DIAGNOSIS — R06.2 WHEEZING: ICD-10-CM

## 2020-08-14 DIAGNOSIS — E78.2 MIXED HYPERLIPIDEMIA: ICD-10-CM

## 2020-08-14 DIAGNOSIS — J43.9 PULMONARY EMPHYSEMA, UNSPECIFIED EMPHYSEMA TYPE: ICD-10-CM

## 2020-08-14 DIAGNOSIS — M15.9 PRIMARY OSTEOARTHRITIS INVOLVING MULTIPLE JOINTS: ICD-10-CM

## 2020-08-14 DIAGNOSIS — F33.1 MODERATE EPISODE OF RECURRENT MAJOR DEPRESSIVE DISORDER: ICD-10-CM

## 2020-08-14 DIAGNOSIS — R06.02 SHORTNESS OF BREATH: ICD-10-CM

## 2020-08-14 DIAGNOSIS — R60.0 BILATERAL LOWER EXTREMITY EDEMA: ICD-10-CM

## 2020-08-14 DIAGNOSIS — E53.8 B12 DEFICIENCY: ICD-10-CM

## 2020-08-14 DIAGNOSIS — E66.01 MORBID OBESITY WITH BMI OF 40.0-44.9, ADULT: ICD-10-CM

## 2020-08-14 DIAGNOSIS — F17.219 CIGARETTE NICOTINE DEPENDENCE WITH NICOTINE-INDUCED DISORDER: ICD-10-CM

## 2020-08-14 DIAGNOSIS — F33.1 MODERATE EPISODE OF RECURRENT MAJOR DEPRESSIVE DISORDER: Chronic | ICD-10-CM

## 2020-08-14 DIAGNOSIS — R03.0 ELEVATED BLOOD PRESSURE READING: ICD-10-CM

## 2020-08-14 DIAGNOSIS — M85.89 OSTEOPENIA OF MULTIPLE SITES: Chronic | ICD-10-CM

## 2020-08-14 DIAGNOSIS — J30.9 CHRONIC ALLERGIC RHINITIS: Chronic | ICD-10-CM

## 2020-08-14 DIAGNOSIS — S46.811A FULL THICKNESS TEAR OF RIGHT SUBSCAPULARIS TENDON: ICD-10-CM

## 2020-08-14 DIAGNOSIS — R63.5 WEIGHT GAIN: ICD-10-CM

## 2020-08-14 DIAGNOSIS — J30.9 CHRONIC ALLERGIC RHINITIS: ICD-10-CM

## 2020-08-14 DIAGNOSIS — J43.9 PULMONARY EMPHYSEMA, UNSPECIFIED EMPHYSEMA TYPE: Chronic | ICD-10-CM

## 2020-08-14 DIAGNOSIS — E78.2 MIXED HYPERLIPIDEMIA: Chronic | ICD-10-CM

## 2020-08-14 DIAGNOSIS — M15.9 PRIMARY OSTEOARTHRITIS INVOLVING MULTIPLE JOINTS: Chronic | ICD-10-CM

## 2020-08-14 DIAGNOSIS — R63.5 WEIGHT GAIN: Primary | ICD-10-CM

## 2020-08-14 DIAGNOSIS — E11.42 TYPE 2 DIABETES MELLITUS WITH DIABETIC POLYNEUROPATHY, WITHOUT LONG-TERM CURRENT USE OF INSULIN: ICD-10-CM

## 2020-08-14 DIAGNOSIS — F17.219 CIGARETTE NICOTINE DEPENDENCE WITH NICOTINE-INDUCED DISORDER: Chronic | ICD-10-CM

## 2020-08-14 DIAGNOSIS — M85.89 OSTEOPENIA OF MULTIPLE SITES: ICD-10-CM

## 2020-08-14 DIAGNOSIS — I10 ESSENTIAL HYPERTENSION: Chronic | ICD-10-CM

## 2020-08-14 DIAGNOSIS — M75.101 TEAR OF RIGHT SUPRASPINATUS TENDON: ICD-10-CM

## 2020-08-14 PROCEDURE — 99215 OFFICE O/P EST HI 40 MIN: CPT | Mod: PBBFAC,PO,25 | Performed by: INTERNAL MEDICINE

## 2020-08-14 PROCEDURE — 99999 PR PBB SHADOW E&M-EST. PATIENT-LVL V: ICD-10-PCS | Mod: PBBFAC,,, | Performed by: INTERNAL MEDICINE

## 2020-08-14 PROCEDURE — 99214 OFFICE O/P EST MOD 30 MIN: CPT | Mod: S$PBB,,, | Performed by: INTERNAL MEDICINE

## 2020-08-14 PROCEDURE — 71046 X-RAY EXAM CHEST 2 VIEWS: CPT | Mod: TC,PO

## 2020-08-14 PROCEDURE — 71046 X-RAY EXAM CHEST 2 VIEWS: CPT | Mod: 26,,, | Performed by: RADIOLOGY

## 2020-08-14 PROCEDURE — 99999 PR PBB SHADOW E&M-EST. PATIENT-LVL V: CPT | Mod: PBBFAC,,, | Performed by: INTERNAL MEDICINE

## 2020-08-14 PROCEDURE — 99214 PR OFFICE/OUTPT VISIT, EST, LEVL IV, 30-39 MIN: ICD-10-PCS | Mod: S$PBB,,, | Performed by: INTERNAL MEDICINE

## 2020-08-14 PROCEDURE — 71046 XR CHEST PA AND LATERAL: ICD-10-PCS | Mod: 26,,, | Performed by: RADIOLOGY

## 2020-08-14 RX ORDER — HYDROCHLOROTHIAZIDE 25 MG/1
25 TABLET ORAL DAILY
Qty: 30 TABLET | Refills: 0 | Status: SHIPPED | OUTPATIENT
Start: 2020-08-14 | End: 2020-08-19 | Stop reason: SDUPTHER

## 2020-08-14 NOTE — PROGRESS NOTES
Subjective:      Patient ID: Fior Esparza is a 65 y.o. female.    Chief Complaint: Pre-op Exam    08/14/2020    HPI   65F here for preop. The patient's last visit with me was on 6/29/2020.    She has been following with Dr. Blanco for right shoulder pain and found to have 10 years of her supraspinatus and subscapularis tendons.  Plan is for reverse total shoulder on 08/27, but the preop notes 9/10.  Today however I note that she is more edematous on exam with swelling in her lower extremities which is new for her and her blood pressure is very high initially 200/82 and on my manual repeat 170/92.  Oxygen 93-96%, which is baseline for her, but she is breathing more heavily.  Denies any chest pain.  She has been working out in her garden.  She does not follow her blood pressures at home.  Her pulse ox is been stable.  She was given Celebrex by me for arthritis, which helped, but then was given Mobic by her orthopedist and has been using Excedrin for her sinuses.  Therefore, she has been taking 3 anti-inflammatories.  We discussed that this could potentially be the cause of her hypertension and lower extremity edema, but need to rule out any cardiac disease.  No fevers or chills.  She otherwise feels well, but does note that her hands are more puffy and it has been difficult for her to get her rings off and she does notice swelling in her legs, but this does come and go.  She has gained significant weight since our last visit.  Suspect fluid.  We discussed repeating labs and echocardiogram controlling the blood pressure by restarting HCTZ and close follow-up with me next week.  We will try to optimize her for surgery because she is in discomfort with her shoulder, but bus make sure that she is stable to proceed with surgery.    Review of patient's allergies indicates:   Allergen Reactions    Contrast media Hives, Shortness Of Breath, Itching and Swelling    Iodinated contrast media Hives, Itching, Shortness Of  Breath and Swelling       Current Outpatient Medications:     albuterol (PROAIR HFA) 90 mcg/actuation inhaler, Inhale 2 puffs into the lungs every 6 (six) hours as needed for Wheezing., Disp: 48 g, Rfl: 3    albuterol-ipratropium (DUO-NEB) 2.5 mg-0.5 mg/3 mL nebulizer solution, Take 3 mLs by nebulization every 6 (six) hours as needed for Wheezing. Rescue, Disp: 1 Box, Rfl: 6    azelastine (ASTELIN) 137 mcg (0.1 %) nasal spray, 2 sprays (274 mcg total) by Nasal route 2 (two) times daily., Disp: 30 mL, Rfl: 2    blood sugar diagnostic Strp, Use to test sugars 2-3 times daily, Disp: 200 each, Rfl: 3    blood-glucose meter kit, Use as directed to check blood sugar qid., Disp: , Rfl:     cyanocobalamin (VITAMIN B-12) 1000 MCG tablet, Take 1 tablet (1,000 mcg total) by mouth once daily., Disp: 90 tablet, Rfl: 1    desvenlafaxine succinate (PRISTIQ) 100 MG Tb24, Take 1 tablet (100 mg total) by mouth once daily., Disp: 90 tablet, Rfl: 1    esomeprazole (NEXIUM) 40 MG capsule, Take 1 capsule (40 mg total) by mouth before breakfast., Disp: 90 capsule, Rfl: 1    fluticasone propionate (FLONASE) 50 mcg/actuation nasal spray, Use 2 puffs in each nostril q day., Disp: 48 g, Rfl: 1    fluticasone-umeclidin-vilanter (TRELEGY ELLIPTA) 100-62.5-25 mcg DsDv, Inhale 1 puff into the lungs once daily., Disp: 180 each, Rfl: 3    hydrOXYzine HCl (ATARAX) 25 MG tablet, Take 1 tablet (25 mg total) by mouth 2 (two) times daily., Disp: 180 tablet, Rfl: 1    lancets Misc, Use to test sugar 3 times daily, Disp: 200 each, Rfl: 3    levocetirizine (XYZAL) 5 MG tablet, Take 1 tablet (5 mg total) by mouth every evening., Disp: 90 tablet, Rfl: 1    lisinopriL (PRINIVIL,ZESTRIL) 20 MG tablet, Take 1 tablet (20 mg total) by mouth once daily., Disp: 90 tablet, Rfl: 1    metFORMIN (GLUCOPHAGE) 1000 MG tablet, Take 1 tablet (1,000 mg total) by mouth 2 (two) times daily with meals., Disp: 180 tablet, Rfl: 1    montelukast (SINGULAIR) 10 mg  tablet, TAKE 1 TABLET EVERY EVENING, Disp: 90 tablet, Rfl: 0    ondansetron (ZOFRAN) 8 MG tablet, Take 1 tablet (8 mg total) by mouth every 8 (eight) hours as needed for Nausea., Disp: 30 tablet, Rfl: 3    pravastatin (PRAVACHOL) 40 MG tablet, Take 1 tablet (40 mg total) by mouth once daily., Disp: 90 tablet, Rfl: 1    pregabalin (LYRICA) 100 MG capsule, Take 1 capsule (100 mg total) by mouth 2 (two) times daily., Disp: 60 capsule, Rfl: 3    SITagliptin (JANUVIA) 50 MG Tab, Take 1 tablet (50 mg total) by mouth once daily., Disp: 90 tablet, Rfl: 1    hydroCHLOROthiazide (HYDRODIURIL) 25 MG tablet, Take 1 tablet (25 mg total) by mouth once daily., Disp: 30 tablet, Rfl: 0    Past Medical History:   Diagnosis Date    Chronic allergic rhinitis 11/21/2019    COPD with emphysema 11/21/2019    Essential hypertension 11/21/2019    Localized osteoarthritis of right shoulder 6/29/2020    Low serum vitamin B12 11/21/2019    Mixed hyperlipidemia 11/21/2019    Moderate episode of recurrent major depressive disorder 11/21/2019    Osteopenia of multiple sites  11/21/2019    Spondylolisthesis of lumbar region 11/21/2019    S/p l4-l5 fusion     Subclinical hypothyroidism     Symptomatic postsurgical menopause 11/21/2019    S/p KIP BSO    TIA (transient ischemic attack)     Type 2 diabetes mellitus with diabetic polyneuropathy, without long-term current use of insulin 11/21/2019     Past Surgical History:   Procedure Laterality Date    BACK SURGERY      CARPAL TUNNEL RELEASE Bilateral 04/17/2015, 11/7/14    dr. gomez    CATARACT EXTRACTION, BILATERAL  09/2018    CHOLECYSTECTOMY      EXTREME LATERAL INTERBODY FUSION (XLIF) OF SPINE  07/07/15, 5/9/19    elías    GANGLION CYST EXCISION Right     right wrist    TOTAL ABDOMINAL HYSTERECTOMY W/ BILATERAL SALPINGOOPHORECTOMY      TOTAL KNEE ARTHROPLASTY Right 10/2019    dr. covington (revision of prior)     Family History   Problem Relation Age of Onset    Rheum  arthritis Mother     Diabetes Father     Breast cancer Maternal Aunt     Breast cancer Sister     Breast cancer Maternal Aunt      Social History     Socioeconomic History    Marital status:      Spouse name: Not on file    Number of children: Not on file    Years of education: Not on file    Highest education level: Not on file   Occupational History    Not on file   Social Needs    Financial resource strain: Not on file    Food insecurity     Worry: Not on file     Inability: Not on file    Transportation needs     Medical: Not on file     Non-medical: Not on file   Tobacco Use    Smoking status: Former Smoker     Packs/day: 2.00     Years: 50.00     Pack years: 100.00     Types: Cigarettes     Quit date: 6/15/2020     Years since quittin.1    Smokeless tobacco: Never Used   Substance and Sexual Activity    Alcohol use: Not Currently    Drug use: Never    Sexual activity: Not Currently     Partners: Male     Birth control/protection: Surgical, Post-menopausal   Lifestyle    Physical activity     Days per week: Not on file     Minutes per session: Not on file    Stress: Only a little   Relationships    Social connections     Talks on phone: Not on file     Gets together: Not on file     Attends [employer] service: Not on file     Active member of club or organization: Not on file     Attends meetings of clubs or organizations: Not on file     Relationship status: Not on file   Other Topics Concern    Not on file   Social History Narrative    Not on file      Review of Systems   Constitutional: Positive for unexpected weight change. Negative for chills and fever.   HENT: Negative for congestion.    Eyes: Negative for visual disturbance.   Respiratory: Positive for cough, shortness of breath and wheezing.    Cardiovascular: Negative for palpitations and chest pain BLE edema  Gastrointestinal: Negative for abdominal pain, blood in stool, diarrhea and nausea.   Endocrine: Negative for  cold intolerance and heat intolerance.   Genitourinary: Negative.  Negative for dysuria.   Musculoskeletal: Negative for back pain and myalgias.   Skin: Negative for rash.   Allergic/Immunologic: Positive for environmental allergies.   Neurological: Negative for syncope.   Hematological: Does not bruise/bleed easily.   Psychiatric/Behavioral: Positive for sleep disturbance. Negative for dysphoric mood and suicidal ideas.         Objective:     Body mass index is 43.2 kg/m².  BP (!) 170/92   Pulse 93   Temp 98.1 °F (36.7 °C)   Ht 5' (1.524 m)   Wt 100.3 kg (221 lb 3.2 oz)   SpO2 (!) 93%   BMI 43.20 kg/m²       Physical Exam  Vitals signs and nursing note reviewed.   Constitutional:       General: She is not in acute distress. Audible wheezing. Appears congested     Appearance: She is well-developed. She is not diaphoretic.   HENT:      Head: Normocephalic.   Eyes:      Conjunctiva/sclera: Conjunctivae normal.   Cardiovascular:      Rate and Rhythm: Normal rate and regular rhythm.      Heart sounds: Normal heart sounds. No murmur.   Pulmonary:      Effort: Pulmonary effort is normal. o2 93%     Breath sounds: Wheezing present. No rales.   Abdominal:      General: Bowel sounds are normal.      Palpations: Abdomen is soft.   Musculoskeletal:         General: No tenderness. 2+ BLE edema. Tightness and swelling of hands  Lymphadenopathy:      Cervical: No cervical adenopathy.   Skin:     General: Skin is warm and dry.      Capillary Refill: Capillary refill takes 2 to 3 seconds.   Neurological:      Mental Status: She is alert and oriented to person, place, and time.      Sensory: No sensory deficit.   Psychiatric:         Behavior: Behavior normal.         Thought Content: Thought content normal.         Judgment: Judgment normal.         Lab Visit on 06/29/2020   Component Date Value Ref Range Status    WBC 06/29/2020 8.36  3.90 - 12.70 K/uL Final    RBC 06/29/2020 4.83  4.00 - 5.40 M/uL Final    Hemoglobin  06/29/2020 14.4  12.0 - 16.0 g/dL Final    Hematocrit 06/29/2020 44.3  37.0 - 48.5 % Final    Mean Corpuscular Volume 06/29/2020 92  82 - 98 fL Final    Mean Corpuscular Hemoglobin 06/29/2020 29.8  27.0 - 31.0 pg Final    Mean Corpuscular Hemoglobin Conc 06/29/2020 32.5  32.0 - 36.0 g/dL Final    RDW 06/29/2020 13.8  11.5 - 14.5 % Final    Platelets 06/29/2020 221  150 - 350 K/uL Final    MPV 06/29/2020 10.7  9.2 - 12.9 fL Final    Immature Granulocytes 06/29/2020 0.4  0.0 - 0.5 % Final    Gran # (ANC) 06/29/2020 4.0  1.8 - 7.7 K/uL Final    Immature Grans (Abs) 06/29/2020 0.03  0.00 - 0.04 K/uL Final    Comment: Mild elevation in immature granulocytes is non specific and   can be seen in a variety of conditions including stress response,   acute inflammation, trauma and pregnancy. Correlation with other   laboratory and clinical findings is essential.      Lymph # 06/29/2020 3.7  1.0 - 4.8 K/uL Final    Mono # 06/29/2020 0.5  0.3 - 1.0 K/uL Final    Eos # 06/29/2020 0.2  0.0 - 0.5 K/uL Final    Baso # 06/29/2020 0.05  0.00 - 0.20 K/uL Final    nRBC 06/29/2020 0  0 /100 WBC Final    Gran% 06/29/2020 47.2  38.0 - 73.0 % Final    Lymph% 06/29/2020 44.3  18.0 - 48.0 % Final    Mono% 06/29/2020 5.6  4.0 - 15.0 % Final    Eosinophil% 06/29/2020 1.9  0.0 - 8.0 % Final    Basophil% 06/29/2020 0.6  0.0 - 1.9 % Final    Differential Method 06/29/2020 Automated   Final    Sodium 06/29/2020 137  136 - 145 mmol/L Final    Potassium 06/29/2020 5.3* 3.5 - 5.1 mmol/L Final    *No Visible Hemolysis    Chloride 06/29/2020 101  95 - 110 mmol/L Final    CO2 06/29/2020 25  23 - 29 mmol/L Final    Glucose 06/29/2020 107  70 - 110 mg/dL Final    BUN, Bld 06/29/2020 14  8 - 23 mg/dL Final    Creatinine 06/29/2020 0.8  0.5 - 1.4 mg/dL Final    Calcium 06/29/2020 9.9  8.7 - 10.5 mg/dL Final    Total Protein 06/29/2020 8.0  6.0 - 8.4 g/dL Final    Albumin 06/29/2020 4.3  3.5 - 5.2 g/dL Final    Total Bilirubin  06/29/2020 0.2  0.1 - 1.0 mg/dL Final    Comment: For infants and newborns, interpretation of results should be based  on gestational age, weight and in agreement with clinical  observations.  Premature Infant recommended reference ranges:  Up to 24 hours.............<8.0 mg/dL  Up to 48 hours............<12.0 mg/dL  3-5 days..................<15.0 mg/dL  6-29 days.................<15.0 mg/dL      Alkaline Phosphatase 06/29/2020 110  55 - 135 U/L Final    AST 06/29/2020 22  10 - 40 U/L Final    ALT 06/29/2020 24  10 - 44 U/L Final    Anion Gap 06/29/2020 11  8 - 16 mmol/L Final    eGFR if African American 06/29/2020 >60.0  >60 mL/min/1.73 m^2 Final    eGFR if non African American 06/29/2020 >60.0  >60 mL/min/1.73 m^2 Final    Comment: Calculation used to obtain the estimated glomerular filtration  rate (eGFR) is the CKD-EPI equation.       Hemoglobin A1C 06/29/2020 6.1* 4.0 - 5.6 % Final    Comment: ADA Screening Guidelines:  5.7-6.4%  Consistent with prediabetes  >or=6.5%  Consistent with diabetes  High levels of fetal hemoglobin interfere with the HbA1C  assay. Heterozygous hemoglobin variants (HbS, HgC, etc)do  not significantly interfere with this assay.   However, presence of multiple variants may affect accuracy.      Estimated Avg Glucose 06/29/2020 128  68 - 131 mg/dL Final    TSH 06/29/2020 2.941  0.400 - 4.000 uIU/mL Final    Magnesium 06/29/2020 2.0  1.6 - 2.6 mg/dL Final    Iron 06/29/2020 74  30 - 160 ug/dL Final    Transferrin 06/29/2020 312  200 - 375 mg/dL Final    TIBC 06/29/2020 462* 250 - 450 ug/dL Final    Saturated Iron 06/29/2020 16* 20 - 50 % Final    Ferritin 06/29/2020 178  20.0 - 300.0 ng/mL Final    Vitamin B-12 06/29/2020 376  210 - 950 pg/mL Final    HIV 1/2 Ag/Ab 06/29/2020 Negative  Negative Final    Methlymalonic Acid 06/29/2020 0.59* <0.40 umol/L Final    Comment: If applicable, any drug confirmation testing reported  here was developed and the performance  characteristics  determined by [facility]. This   confirmation testing has not been cleared or approved  by the FDA. The laboratory is regulated under CLIA as  qualified to perform high-complexity testing. This test  is used for patient testing purposes. It should not be  regarded as investigational or for research.  Test performed at [facility],  [address] , [place]  [address]     [phone]  Curtis Hale MD  - Medical Director      Lipase 06/29/2020 44  4 - 60 U/L Final   Office Visit on 05/21/2020   Component Date Value Ref Range Status    SARS-CoV2 (COVID-19) Qualitative P* 05/21/2020 Not Detected  Not Detected Final    Comment: This test utilizes a real-time reverse transcription  polymerase chain reaction procedure to amplify and   detect the SARS-CoV-2 RdRp and N genes.    The analytical sensitivity (limit of detection) of   this assay is 100 copies/mL.   A Detected result is considered positive for COVID-19.  This patient is considered infected with the   SARS-CoV-2 virus and is presumed to be contagious.    A Not Detected result means that SARS-CoV-2 RNA is not  present above the limit of detection. It does not rule  out the possibility of COVID-19 and should not be the  sole basis for treatment decisions.  If COVID-19 is   strongly suspected based on clinical and exposure   history,re-testing should be considered.    This test is only for use under Food and Drug   Administration s Emergency Use Authorization (EUA).   Commercial reagents are provided by [employer]  Performance characteristics of the EUA have been   independently verified by [facility]   Department of Pathology and L                           aboratory Medicine.         No results found in the last 24 hours.   Assessment:     Encounter Diagnoses   Name Primary?    Weight gain Yes    Moderate episode of recurrent major depressive disorder     Pulmonary emphysema,  unspecified emphysema type     Essential hypertension     Mixed hyperlipidemia     Type 2 diabetes mellitus with diabetic polyneuropathy, without long-term current use of insulin     Morbid obesity with BMI of 40.0-44.9, adult     B12 deficiency     Osteopenia of multiple sites      Primary osteoarthritis involving multiple joints     Localized osteoarthritis of right shoulder     Chronic allergic rhinitis     Cigarette nicotine dependence with nicotine-induced disorder     Shortness of breath     Elevated blood pressure reading     Wheezing     Bilateral lower extremity edema     Tear of right supraspinatus tendon     Full thickness tear of right subscapularis tendon         Plan:     #Weight gain, BLE edema, SOB  -rule out cardiac and pulmonary decompensation  -has been taking excedrin, mobic, and celebrex  -cxr, tte, labs today, restart hctz     #Lumbar spondylolisthesis  #Primary OA- right knee s/p right TKA  -S/p S/p L4-L5 lumbar fusion pseudoarthrosis revision with rhBMP-2 on 5/9/19 with Dr. Perez & right TKA with Dr. Duarte on 10/9/19  -Now stable per Ortho    #Bilateral calcific tendinitis with right shoulder OA  #Full thickness tear of distal supraspinatus and subscapularis tendon  -following with dr. Blanco- planning for reverse total shoulder. Scheduled for 8/27, but we need to repeat studies and ensure no cardiac or pulmonary disease and control BP    #Generalized OA  -has been taking celebrex 200 mg daily & mobic --> advised to stop mobic and celebrex    #Diarrhea, resolved!  -TSH suppressed, therefore, synthroid stopped with no change in symptoms. Occurred long after metformin use. Discontinued Magnesium    #Sympatomatic postsurgical menopause (S/p KIP & BSO)- resolved!  -established with dr. Maurice  -tried paxil and gabapentin without success  - lyrica 100 mg BID (pain control, flashes controlled).  reviewed. Last fill on 6/27/20  -Pap 2/15/19. Dr. Maurice. No malignancy. No reflex  to HPV. Repeat in 5 yr.    #MDD  #Insomnia  -Previous agents: Lexapro, Wellbutrin, Zoloft, Prozac, Paxil, Cymbalta, Trazodone, Seroquel  -11/2019: started pristiq 50 mg daily. Increased to 100 mg daily. Seroquel not helping with sleep. Started Hydroxyzine 25 mg nightly, but may increase to 50 mg and take once daily for anxiety if needed. Counseled on sedation.  - doing ok with pristiq 100 mg daily + hydroxyzine to 50 mg qhs and 25 during day     #COPD, stable  PFT 1/11/18: FEV1/FEVC 54.59, FEV1 1  -Follows with Pulmonology  -Continue Trelegy daily  -Nebulizers q6H     #Subclinical hypothyroidism --> suppressed TSH --> now resolved  TSH 6.97, normal fT4 in 10/2018 --> 2/19: TSH 0.64, normal fT4  -Empirically treated with synthroid 100 mcg daily to assess response of fatigue, however, unchanged.  TSH checked on 9/13/19 due to diarrhea: 0.02. Synthroid stopped. TSH on 12/6/19: normal 2.085  Lab Results   Component Value Date    TSH 2.941 06/29/2020     #NIDDM2 c/b neuropathy  ha1c 7.1% in 2/2019 --> 6.2% on 6/19/19 --> 12/6/19: 5.9%  -Metformin 1,000 mg BID   -prot/cr ratio 5 in 10/12/18 --> 6.8 on 12/6/19  - lyrica 100 mg BID for neuropathy  -foot exam done 1/31/20 normal  -6/29/19: januvia increased to 50 mg daily  Lab Results   Component Value Date    HGBA1C 6.1 (H) 06/29/2020     #b12 deficiency  -b12 level 519 on 10/12/18 --> 278 on 9/13/19 --> 336 + normal MMA on 12/6  -MMA 0.59. started b12 oral daily  Lab Results   Component Value Date    [id number] 376 06/29/2020    [id number] 336 12/06/2019     #HTN- elevated  -Lisinopril/HCTZ 20/12.5 mg daily. Stopped Lasix 40 mg daily at 10/8/18 visit (on HCTZ). Echo within normal limits in March 2017. Stopped Verapamil 240 mg daily.  -12/21/19: reduce lisinopril to 10/12.5 mg daily.  /65. She did not get the med and continued prior dose.  -1/31. /62. STOPPED lisinopril/hctz 20/12.5 mg daily and START lisinopril 20 mg daily. Aim for goal 120/80  -has been taking  mobic, excedrin and celebrex. Advised to stop mobic.  -restart hctz 25 mg daily. Continue lisinopril 40 mg    #Nicotine dependence, cigarettes c/b COPD  -Declines cessation clinic referral, chantix, patches, and wellbutrin  -counseled on risks. Not ready to quit.  -ct chest 1/17/20: normal. No nodules or masses noted   -wellbutrin caused hallucinations.     #Obesity- BMI 37.07 --> 37.22  --> 40.62 -->43.2  Body mass index is 43.2 kg/m².  Wt Readings from Last 1 Encounters:   08/14/20 0727 100.3 kg (221 lb 3.2 oz)   -4/2019: 227 pounds. 6/19: 209 pounds. 9/2019: 214 --> 11/21: 199 pounds  -12/21: 196 --> 1/31: 197 --> 6/29: 208 lb --> 221 lb  -restart hctz.     #SURJIT, resolved   10/12/18: H/H 11.5/35, MCV 83.5, ferritin 14, iron 31  2/8/19: Iron 44, ferritin 25, H/H 12.9/37.9, MCV 87.1  -S/p IV iron infusion. Can not tolerate oral iron. Previously had restless legs and was treated with requip, but d/c  -Repeat studies improved in 10/2019 and stable 12/2019  Lab Results   Component Value Date    IRON 74 06/29/2020    TIBC 462 (H) 06/29/2020    FERRITIN 178 06/29/2020      #GERD  -Nexium 40 mg daily     #HLD  -lipid panel 10/12/18: chol 156, tri 98, hdl 76, ldl 60  -lipid panel 9/13/19: chol 146, tri 143, hdl 46, ldl 71  -Continue Pravastatin 40 mg daily  -repeat   Lab Results   Component Value Date    CHOL 146 09/13/2019    TRIG 143 09/13/2019    HDL 46 09/13/2019    LDLCALC 71 09/13/2019     #Multiple TIAs  -Stoped ASA 81 mg daily     #Osetopenia  -Dexa 5/2015 & 12/6/19. Osteopenia. T -1.2  -Vit D 23.6 on 10/12/18 --> 30 on 9/13/19  -Continue Vit D 800 units daily   -repeat dexa in 2 years    #Environmental allergies  -resume singulair + xyzal daily  -declines allergy referral    #Healthcare Maintenance  -Mammogram 1/31/20: birads 1. Repeat in 2 years.   -Pneumovax 10/2014.   -Shingrix UTD (11/15/18, 3/27/19)  -TDAP 7/31/19  -Colonoscopy 5/18/17. Will get records. Having repeat done soon ([facility])  -Hep C screening  negative 10/12/18  -6/29/20: HIV negative    Has maria luisa.      Kenzie Loza M.D.    Orders Placed This Encounter   Procedures    X-Ray Chest PA And Lateral    CBC auto differential    Comprehensive metabolic panel    Lipid Panel    TSH    Magnesium    Iron and TIBC    Ferritin    Vitamin D    Brain Natriuretic Peptide    Troponin I    Lactate Dehydrogenase      Medications Ordered This Encounter   Medications    hydroCHLOROthiazide (HYDRODIURIL) 25 MG tablet     Sig: Take 1 tablet (25 mg total) by mouth once daily.     Dispense:  30 tablet     Refill:  0     .

## 2020-08-15 DIAGNOSIS — E55.9 VITAMIN D INSUFFICIENCY: Primary | ICD-10-CM

## 2020-08-15 RX ORDER — VIT C/E/ZN/COPPR/LUTEIN/ZEAXAN 250MG-90MG
1000 CAPSULE ORAL DAILY
Qty: 90 CAPSULE | Refills: 1 | Status: SHIPPED | OUTPATIENT
Start: 2020-08-15

## 2020-08-19 DIAGNOSIS — F33.1 MODERATE EPISODE OF RECURRENT MAJOR DEPRESSIVE DISORDER: ICD-10-CM

## 2020-08-19 DIAGNOSIS — I10 ESSENTIAL HYPERTENSION: Chronic | ICD-10-CM

## 2020-08-19 NOTE — TELEPHONE ENCOUNTER
----- Message from Belia Huerta sent at 8/19/2020 11:44 AM CDT -----  Pt would like return call regarding question about one her prescsritpion medications. Please call back at  [phone].  Md Susannah

## 2020-08-19 NOTE — TELEPHONE ENCOUNTER
----- Message from Janae Anderson sent at 8/19/2020 10:21 AM CDT -----  Contact: self/[phone]  Type:  RX Refill Request    Who Called: Patient  Refill or New Rx:refill  RX Name and Strength:hydroCHLOROthiazide (HYDRODIURIL) 25 MG tablet  How is the patient currently taking it? (ex. 1XDay):2 X Day  Is this a 30 day or 90 day RX:90  Preferred Pharmacy with phone number:  [facility] - [address]  [address]  [facility] [address]  Phone: [phone] Fax: [phone]      Local or Mail Order:local  Ordering Provider:Dr Dorsey  Would the patient rather a call back or a response via [facility]? call  Best Call Back Number:[phone]  Additional Information:     Erin/MICKIE

## 2020-08-20 ENCOUNTER — HOSPITAL ENCOUNTER (OUTPATIENT)
Dept: CARDIOLOGY | Facility: HOSPITAL | Age: 65
Discharge: HOME | End: 2020-08-20
Attending: INTERNAL MEDICINE
Payer: MEDICARE

## 2020-08-20 VITALS
HEIGHT: 60 IN | DIASTOLIC BLOOD PRESSURE: 92 MMHG | WEIGHT: 221 LBS | HEART RATE: 91 BPM | BODY MASS INDEX: 43.39 KG/M2 | SYSTOLIC BLOOD PRESSURE: 170 MMHG

## 2020-08-20 DIAGNOSIS — R06.2 WHEEZING: ICD-10-CM

## 2020-08-20 DIAGNOSIS — R60.0 BILATERAL LOWER EXTREMITY EDEMA: ICD-10-CM

## 2020-08-20 DIAGNOSIS — R06.02 SHORTNESS OF BREATH: ICD-10-CM

## 2020-08-20 DIAGNOSIS — R03.0 ELEVATED BLOOD PRESSURE READING: ICD-10-CM

## 2020-08-20 DIAGNOSIS — R63.5 WEIGHT GAIN: ICD-10-CM

## 2020-08-20 PROCEDURE — 93306 TTE W/DOPPLER COMPLETE: CPT | Mod: PO

## 2020-08-20 PROCEDURE — 93306 ECHO (CUPID ONLY): ICD-10-PCS | Mod: 26,,, | Performed by: INTERNAL MEDICINE

## 2020-08-20 PROCEDURE — 93306 TTE W/DOPPLER COMPLETE: CPT | Mod: 26,,, | Performed by: INTERNAL MEDICINE

## 2020-08-20 RX ORDER — HYDROXYZINE HYDROCHLORIDE 25 MG/1
25 TABLET, FILM COATED ORAL 2 TIMES DAILY
Qty: 30 TABLET | Refills: 0 | Status: SHIPPED | OUTPATIENT
Start: 2020-08-20

## 2020-08-20 RX ORDER — HYDROXYZINE HYDROCHLORIDE 25 MG/1
25 TABLET, FILM COATED ORAL 2 TIMES DAILY
Qty: 180 TABLET | Refills: 1 | Status: SHIPPED | OUTPATIENT
Start: 2020-08-20 | End: 2020-08-24 | Stop reason: SDUPTHER

## 2020-08-20 NOTE — TELEPHONE ENCOUNTER
Spoke with patient requesting refill, needs one sent to pharmacy here to hold her until mail order arrives, patient completely out of med

## 2020-08-20 NOTE — TELEPHONE ENCOUNTER
----- Message from [facility] sent at 8/20/2020  8:45 AM CDT -----  Pt is returning a missed call pt can be reached at [phone]

## 2020-08-21 LAB
AV INDEX (PROSTH): 0.65
AV LVOT PEAK GRADIENT: 4 MMHG
AV MEAN GRADIENT: 8 MMHG
AV PEAK GRADIENT: 17 MMHG
AV VALVE AREA: 1.99 CM2
AV VELOCITY RATIO: 0.49
BSA FOR ECHO PROCEDURE: 2.06 M2
CV ECHO LV RWT: 0.58 CM
DOP CALC AO PEAK VEL: 2.06 M/S
DOP CALC AO VTI: 35.1 CM
DOP CALC LVOT AREA: 3.1 CM2
DOP CALC LVOT DIAMETER: 1.98 CM
DOP CALC LVOT PEAK VEL: 1 M/S
DOP CALC LVOT STROKE VOLUME: 69.71 CM3
DOP CALC RVOT PEAK VEL: 0.93 M/S
DOP CALC RVOT VTI: 16.47 CM
DOP CALCLVOT PEAK VEL VTI: 22.65 CM
E WAVE DECELERATION TIME: 243.98 MS
E/A RATIO: 0.73
E/E' RATIO: 8.82 M/S
ECHO EF ESTIMATED: 50 %
ECHO LV POSTERIOR WALL: 1.25 CM (ref 0.6–1.1)
FRACTIONAL SHORTENING: 25 % (ref 28–44)
INTERVENTRICULAR SEPTUM: 0.88 CM (ref 0.6–1.1)
IVRT: 152.25 MS
LA MAJOR: 4 CM
LA MINOR: 3.9 CM
LA WIDTH: 2.7 CM
LEFT ATRIUM SIZE: 3.21 CM
LEFT ATRIUM VOLUME INDEX: 14.9 ML/M2
LEFT ATRIUM VOLUME: 29.09 CM3
LEFT INTERNAL DIMENSION IN SYSTOLE: 3.2 CM (ref 2.1–4)
LEFT VENTRICLE DIASTOLIC VOLUME INDEX: 42.62 ML/M2
LEFT VENTRICLE DIASTOLIC VOLUME: 83 ML
LEFT VENTRICLE MASS INDEX: 80 G/M2
LEFT VENTRICLE SYSTOLIC VOLUME INDEX: 21.1 ML/M2
LEFT VENTRICLE SYSTOLIC VOLUME: 41 ML
LEFT VENTRICULAR INTERNAL DIMENSION IN DIASTOLE: 4.29 CM (ref 3.5–6)
LEFT VENTRICULAR MASS: 155.07 G
LV LATERAL E/E' RATIO: 10.71 M/S
LV SEPTAL E/E' RATIO: 7.5 M/S
MV PEAK A VEL: 1.03 M/S
MV PEAK E VEL: 0.75 M/S
MV STENOSIS PRESSURE HALF TIME: 71 MS
MV VALVE AREA P 1/2 METHOD: 3.1 CM2
PISA TR MAX VEL: 3.04 M/S
PROX AORTA: 3.19 CM
PULM VEIN A" WAVE DURATION": 117.65 MS
PULM VEIN S/D RATIO: 1.29
PV MEAN GRADIENT: 2 MMHG
PV PEAK D VEL: 0.59 M/S
PV PEAK S VEL: 0.76 M/S
PV PEAK VELOCITY: 1.24 M/S
RA MAJOR: 3.6 CM
RA PRESSURE: 3 MMHG
RA WIDTH: 3.2 CM
RIGHT VENTRICULAR END-DIASTOLIC DIMENSION: 1.68 CM
SINUS: 3 CM
STJ: 3.1 CM
TDI LATERAL: 0.07 M/S
TDI SEPTAL: 0.1 M/S
TDI: 0.09 M/S
TR MAX PG: 37 MMHG
TRICUSPID ANNULAR PLANE SYSTOLIC EXCURSION: 1.9 CM
TV REST PULMONARY ARTERY PRESSURE: 40 MMHG

## 2020-08-24 ENCOUNTER — OFFICE VISIT (OUTPATIENT)
Dept: FAMILY MEDICINE | Facility: CLINIC | Age: 65
End: 2020-08-24
Payer: MEDICARE

## 2020-08-24 VITALS
BODY MASS INDEX: 42.21 KG/M2 | WEIGHT: 215 LBS | TEMPERATURE: 97 F | SYSTOLIC BLOOD PRESSURE: 138 MMHG | HEART RATE: 86 BPM | DIASTOLIC BLOOD PRESSURE: 80 MMHG | HEIGHT: 60 IN

## 2020-08-24 DIAGNOSIS — E78.2 MIXED HYPERLIPIDEMIA: Chronic | ICD-10-CM

## 2020-08-24 DIAGNOSIS — Z71.2 ENCOUNTER TO DISCUSS TEST RESULTS: ICD-10-CM

## 2020-08-24 DIAGNOSIS — M15.9 PRIMARY OSTEOARTHRITIS INVOLVING MULTIPLE JOINTS: Chronic | ICD-10-CM

## 2020-08-24 DIAGNOSIS — I27.20 PULMONARY HYPERTENSION: Chronic | ICD-10-CM

## 2020-08-24 DIAGNOSIS — S46.811A FULL THICKNESS TEAR OF RIGHT SUBSCAPULARIS TENDON: ICD-10-CM

## 2020-08-24 DIAGNOSIS — J43.9 PULMONARY EMPHYSEMA, UNSPECIFIED EMPHYSEMA TYPE: Chronic | ICD-10-CM

## 2020-08-24 DIAGNOSIS — Z01.818 PRE-OP EXAMINATION: Primary | ICD-10-CM

## 2020-08-24 DIAGNOSIS — E66.01 MORBID OBESITY WITH BMI OF 40.0-44.9, ADULT: ICD-10-CM

## 2020-08-24 DIAGNOSIS — E11.42 TYPE 2 DIABETES MELLITUS WITH DIABETIC POLYNEUROPATHY, WITHOUT LONG-TERM CURRENT USE OF INSULIN: Chronic | ICD-10-CM

## 2020-08-24 DIAGNOSIS — I10 ESSENTIAL HYPERTENSION: Chronic | ICD-10-CM

## 2020-08-24 DIAGNOSIS — M75.101 TEAR OF RIGHT SUPRASPINATUS TENDON: ICD-10-CM

## 2020-08-24 DIAGNOSIS — E55.9 VITAMIN D INSUFFICIENCY: ICD-10-CM

## 2020-08-24 DIAGNOSIS — F17.219 CIGARETTE NICOTINE DEPENDENCE WITH NICOTINE-INDUCED DISORDER: Chronic | ICD-10-CM

## 2020-08-24 DIAGNOSIS — F33.1 MODERATE EPISODE OF RECURRENT MAJOR DEPRESSIVE DISORDER: Chronic | ICD-10-CM

## 2020-08-24 PROBLEM — M19.011 LOCALIZED OSTEOARTHRITIS OF RIGHT SHOULDER: Chronic | Status: ACTIVE | Noted: 2020-06-29

## 2020-08-24 PROCEDURE — 99999 PR PBB SHADOW E&M-EST. PATIENT-LVL IV: ICD-10-PCS | Mod: PBBFAC,,, | Performed by: INTERNAL MEDICINE

## 2020-08-24 PROCEDURE — 99214 OFFICE O/P EST MOD 30 MIN: CPT | Mod: PBBFAC,PO | Performed by: INTERNAL MEDICINE

## 2020-08-24 PROCEDURE — 99214 PR OFFICE/OUTPT VISIT, EST, LEVL IV, 30-39 MIN: ICD-10-PCS | Mod: S$GLB,,, | Performed by: INTERNAL MEDICINE

## 2020-08-24 PROCEDURE — 99999 PR PBB SHADOW E&M-EST. PATIENT-LVL IV: CPT | Mod: PBBFAC,,, | Performed by: INTERNAL MEDICINE

## 2020-08-24 PROCEDURE — 99214 OFFICE O/P EST MOD 30 MIN: CPT | Mod: S$GLB,,, | Performed by: INTERNAL MEDICINE

## 2020-08-24 RX ORDER — HYDROCHLOROTHIAZIDE 25 MG/1
25 TABLET ORAL DAILY
Qty: 90 TABLET | Refills: 1 | Status: SHIPPED | OUTPATIENT
Start: 2020-08-24 | End: 2020-12-04 | Stop reason: SDUPTHER

## 2020-08-24 NOTE — PROGRESS NOTES
Subjective:      Patient ID: Fior Esparza is a 65 y.o. female.    Chief Complaint: Pre-op Exam    08/24/2020    HPI   65F here for preop examination. The patient's last visit with me was on 8/14/2020.    Surgeon:  -Dr. Luisa Blanco     Surgery:   -Reverse Total Shoulder     Date:   -8/27/20    Active Cardiac Conditions:   -MI: no  -Decompensated Heart Failure: no  -Significant Arrhythmia: no  -Severe AS or symptomatic MS: no    Clinical Risk Factors:  -History of Ischemic heart disease: no  -History of CHF: no (see echo attached)  -History of CVA or TIA: no  -Diabetes on insulin: no  -Renal insufficiency (cr >2): no    Functional Capacity:  -4-10 mets: limited by back and leg pain, but cleans her home, cooks, and is independent. Has support at home.    History or postoperative complications:   -Malignant hyperthermia: no  -DVT or PE: no  -Anesthesia complications: no  -History of bleeding disorder: no    Medications:   -ASA: yes  -Anticoagulants: no  -OTC medications/NSAIDs: none --> she stopped excedrin, mobic, and celebrex    Tobacco use:  -Smoker: down to 1-2 cigarettes per day. Previously was 2 ppd until 2016.    ROS:   -CP, SOB, headaches, fevers/chills, palpitations: NO      She has been following with Dr. Blanco for right shoulder pain and found to have full thickness tear of the supraspinatus tendon with retraction, full-thickness tearing of the subscapularis with medial subluxation of the biceps tendon . Plan is for reverse total shoulder on 08/27/20. Her BP at last visit was 200/82 and 170/92. She was sent for TTE, which is reviewed below showing pHTN, but otherwise normal. HCTZ was added to blood pressure regimen. She was given Celebrex by me for arthritis, which helped, but then was given Mobic by her orthopedist and has been using Excedrin for her sinuses.  Therefore, she has been taking 3 anti-inflammatories.  She discontinued this after our visit. Labs were ordered. BP now well controlled (was  elevated at first- smoked before visit). 138/80 at end. At home 120/60-70s. She has lost 6 pounds of fluid since starting hctz. She feels ready for the surgery! We discussed medications as below and reviewed TTE.       Review of patient's allergies indicates:   Allergen Reactions    Contrast media Hives, Shortness Of Breath, Itching and Swelling    Iodinated contrast media Hives, Itching, Shortness Of Breath and Swelling       Current Outpatient Medications:     albuterol (PROAIR HFA) 90 mcg/actuation inhaler, Inhale 2 puffs into the lungs every 6 (six) hours as needed for Wheezing., Disp: 48 g, Rfl: 3    albuterol-ipratropium (DUO-NEB) 2.5 mg-0.5 mg/3 mL nebulizer solution, Take 3 mLs by nebulization every 6 (six) hours as needed for Wheezing. Rescue, Disp: 1 Box, Rfl: 6    azelastine (ASTELIN) 137 mcg (0.1 %) nasal spray, 2 sprays (274 mcg total) by Nasal route 2 (two) times daily., Disp: 30 mL, Rfl: 2    blood sugar diagnostic Strp, Use to test sugars 2-3 times daily, Disp: 200 each, Rfl: 3    blood-glucose meter kit, Use as directed to check blood sugar qid., Disp: , Rfl:     cholecalciferol, vitamin D3, (VITAMIN D3) 25 mcg (1,000 unit) capsule, Take 1 capsule (1,000 Units total) by mouth once daily., Disp: 90 capsule, Rfl: 1    cyanocobalamin (VITAMIN B-12) 1000 MCG tablet, Take 1 tablet (1,000 mcg total) by mouth once daily., Disp: 90 tablet, Rfl: 1    desvenlafaxine succinate (PRISTIQ) 100 MG Tb24, Take 1 tablet (100 mg total) by mouth once daily., Disp: 90 tablet, Rfl: 1    esomeprazole (NEXIUM) 40 MG capsule, Take 1 capsule (40 mg total) by mouth before breakfast., Disp: 90 capsule, Rfl: 1    fluticasone propionate (FLONASE) 50 mcg/actuation nasal spray, Use 2 puffs in each nostril q day., Disp: 48 g, Rfl: 1    fluticasone-umeclidin-vilanter (TRELEGY ELLIPTA) 100-62.5-25 mcg DsDv, Inhale 1 puff into the lungs once daily., Disp: 180 each, Rfl: 3    hydroCHLOROthiazide (HYDRODIURIL) 25 MG  tablet, Take 1 tablet (25 mg total) by mouth once daily., Disp: 30 tablet, Rfl: 0    hydrOXYzine HCL (ATARAX) 25 MG tablet, Take 1 tablet (25 mg total) by mouth 2 (two) times a day., Disp: 30 tablet, Rfl: 0    lancets Misc, Use to test sugar 3 times daily, Disp: 200 each, Rfl: 3    levocetirizine (XYZAL) 5 MG tablet, Take 1 tablet (5 mg total) by mouth every evening., Disp: 90 tablet, Rfl: 1    lisinopriL (PRINIVIL,ZESTRIL) 20 MG tablet, Take 1 tablet (20 mg total) by mouth once daily., Disp: 90 tablet, Rfl: 1    metFORMIN (GLUCOPHAGE) 1000 MG tablet, Take 1 tablet (1,000 mg total) by mouth 2 (two) times daily with meals., Disp: 180 tablet, Rfl: 1    montelukast (SINGULAIR) 10 mg tablet, TAKE 1 TABLET EVERY EVENING, Disp: 90 tablet, Rfl: 0    ondansetron (ZOFRAN) 8 MG tablet, Take 1 tablet (8 mg total) by mouth every 8 (eight) hours as needed for Nausea., Disp: 30 tablet, Rfl: 3    pravastatin (PRAVACHOL) 40 MG tablet, Take 1 tablet (40 mg total) by mouth once daily., Disp: 90 tablet, Rfl: 1    pregabalin (LYRICA) 100 MG capsule, Take 1 capsule (100 mg total) by mouth 2 (two) times daily., Disp: 60 capsule, Rfl: 3    SITagliptin (JANUVIA) 50 MG Tab, Take 1 tablet (50 mg total) by mouth once daily., Disp: 90 tablet, Rfl: 1    Past Medical History:   Diagnosis Date    Chronic allergic rhinitis 11/21/2019    COPD with emphysema 11/21/2019    Essential hypertension 11/21/2019    Localized osteoarthritis of right shoulder 6/29/2020    Mixed hyperlipidemia 11/21/2019    Moderate episode of recurrent major depressive disorder 11/21/2019    Osteopenia of multiple sites  11/21/2019    Pulmonary hypertension 8/24/2020    Spondylolisthesis of lumbar region 11/21/2019    S/p l4-l5 fusion     Subclinical hypothyroidism     Symptomatic postsurgical menopause 11/21/2019    S/p KIP BSO    TIA (transient ischemic attack)     Type 2 diabetes mellitus with diabetic polyneuropathy, without long-term current use  of insulin 2019    Vitamin D insufficiency 8/15/2020     Past Surgical History:   Procedure Laterality Date    BACK SURGERY      CARPAL TUNNEL RELEASE Bilateral 2015, 14    dr. gomez    CATARACT EXTRACTION, BILATERAL  2018    CHOLECYSTECTOMY      EXTREME LATERAL INTERBODY FUSION (XLIF) OF SPINE  07/07/15, 19    elías    GANGLION CYST EXCISION Right     right wrist    TOTAL ABDOMINAL HYSTERECTOMY W/ BILATERAL SALPINGOOPHORECTOMY      TOTAL KNEE ARTHROPLASTY Right 10/2019    dr. covington (revision of prior)     Family History   Problem Relation Age of Onset    Rheum arthritis Mother     Diabetes Father     Breast cancer Maternal Aunt     Breast cancer Sister     Breast cancer Maternal Aunt      Social History     Socioeconomic History    Marital status:      Spouse name: Not on file    Number of children: Not on file    Years of education: Not on file    Highest education level: Not on file   Occupational History    Not on file   Social Needs    Financial resource strain: Not on file    Food insecurity     Worry: Not on file     Inability: Not on file    Transportation needs     Medical: Not on file     Non-medical: Not on file   Tobacco Use    Smoking status: Former Smoker     Packs/day: 2.00     Years: 50.00     Pack years: 100.00     Types: Cigarettes     Quit date: 6/15/2020     Years since quittin.1    Smokeless tobacco: Never Used   Substance and Sexual Activity    Alcohol use: Not Currently    Drug use: Never    Sexual activity: Not Currently     Partners: Male     Birth control/protection: Surgical, Post-menopausal   Lifestyle    Physical activity     Days per week: Not on file     Minutes per session: Not on file    Stress: Only a little   Relationships    Social connections     Talks on phone: Not on file     Gets together: Not on file     Attends [employer] service: Not on file     Active member of club or organization: Not on file      Attends meetings of clubs or organizations: Not on file     Relationship status: Not on file   Other Topics Concern    Not on file   Social History Narrative    Not on file      Review of Systems   Constitutional: Negative for chills and fever.   HENT: Negative for congestion.    Eyes: Negative for visual disturbance.   Respiratory: Positive for cough and wheezing (stable- copd). Negative for shortness of breath.         Stable- copd     Cardiovascular: Negative for chest pain and palpitations.   Gastrointestinal: Negative for abdominal pain and nausea.   Endocrine: Negative for cold intolerance and heat intolerance.   Genitourinary: Negative for dysuria.   Musculoskeletal: Positive for arthralgias and back pain. Negative for myalgias.   Skin: Negative for rash.   Allergic/Immunologic: Negative for environmental allergies.   Neurological: Negative for dizziness, syncope and headaches.   Hematological: Does not bruise/bleed easily.   Psychiatric/Behavioral: Negative for dysphoric mood, sleep disturbance and suicidal ideas. The patient is not nervous/anxious.            Objective:     Body mass index is 41.99 kg/m².  /80   Pulse 86   Temp 97.3 °F (36.3 °C)   Ht 5' (1.524 m)   Wt 97.5 kg (215 lb)   BMI 41.99 kg/m²       Physical Exam  Vitals signs and nursing note reviewed.   Constitutional:       General: She is not in acute distress.     Appearance: Normal appearance. She is well-developed. She is not diaphoretic.   HENT:      Head: Normocephalic and atraumatic.   Eyes:      Conjunctiva/sclera: Conjunctivae normal.   Cardiovascular:      Rate and Rhythm: Normal rate and regular rhythm.      Heart sounds: Normal heart sounds. No murmur.   Pulmonary:      Effort: Pulmonary effort is normal.      Breath sounds: Normal breath sounds.      Comments: 95% pulse ox. No wheezing or cough. SOB.   Abdominal:      General: Bowel sounds are normal.      Palpations: Abdomen is soft.   Musculoskeletal:         General:  "No tenderness.      Right lower leg: No edema.      Left lower leg: No edema.      Comments: No swelling in lower extremities    Lymphadenopathy:      Cervical: No cervical adenopathy.   Skin:     General: Skin is warm and dry.      Capillary Refill: Capillary refill takes 2 to 3 seconds.   Neurological:      General: No focal deficit present.      Mental Status: She is alert and oriented to person, place, and time.      Sensory: No sensory deficit.   Psychiatric:         Mood and Affect: Mood normal.         Behavior: Behavior normal.               Hospital Outpatient Visit on 08/20/2020   Component Date Value Ref Range Status    Echo EF Estimated 08/20/2020 50  % Final    IVS 08/20/2020 0.88  0.6 - 1.1 cm Final    LVIDD 08/20/2020 4.29  3.5 - 6.0 cm Final    LVIDS 08/20/2020 3.20  2.1 - 4.0 cm Final    LVOT diameter 08/20/2020 1.98  cm Final    PW 08/20/2020 1.25* 0.6 - 1.1 cm Final    IVRT 08/20/2020 152.25  ms Final    AV LVOT peak gradient 08/20/2020 4  mmHg Final    Triscuspid Valve Regurgitation Pea* 08/20/2020 37  mmHg Final    LVOT peak rolando 08/20/2020 1.00  m/s Final    LVOT peak VTI 08/20/2020 22.65  cm Final    RVDD 08/20/2020 1.68  cm Final    RVOT peak rolando 08/20/2020 0.93  m/s Final    RVOT peak VTI 08/20/2020 16.47  cm Final    LA size 08/20/2020 3.21  cm Final    AV mean gradient 08/20/2020 8  mmHg Final    Ao peak rolando 08/20/2020 2.06  m/s Final    Ao VTI 08/20/2020 35.10  cm Final    MV Peak A Rolando 08/20/2020 1.03  m/s Final    E wave decelartion time 08/20/2020 243.98  ms Final    MV Peak E Rolando 08/20/2020 0.75  m/s Final    E/A ratio 08/20/2020 0.73   Final    MV valve area p 1/2 method 08/20/2020 3.10  cm2 Final    PV PEAK VELOCITY 08/20/2020 1.24  m/s Final    Proximal aorta 08/20/2020 3.19  cm Final    Pulm vein "A" wave 08/20/2020 117.65  ms Final    PV Peak D Rolando 08/20/2020 0.59  m/s Final    PV Peak S Rolando 08/20/2020 0.76  m/s Final    BSA 08/20/2020 2.06  m2 Final "    TDI SEPTAL 08/20/2020 0.10  m/s Final    LV LATERAL E/E' RATIO 08/20/2020 10.71  m/s Final    LV SEPTAL E/E' RATIO 08/20/2020 7.50  m/s Final    LA WIDTH 08/20/2020 2.70  cm Final    TDI LATERAL 08/20/2020 0.07  m/s Final    FS 08/20/2020 25  28 - 44 % Final    LA volume 08/20/2020 29.09  cm3 Final    Sinus 08/20/2020 3.00  cm Final    STJ 08/20/2020 3.10  cm Final    LV mass 08/20/2020 155.07  g Final    TAPSE 08/20/2020 1.90  cm Final    Left Ventricle Relative Wall Thick* 08/20/2020 0.58  cm Final    AV valve area 08/20/2020 1.99  cm2 Final    AV Velocity Ratio 08/20/2020 0.49   Final    AV index (prosthetic) 08/20/2020 0.65   Final    Mean e' 08/20/2020 0.09  m/s Final    Pulm vein S/D ratio 08/20/2020 1.29   Final    LVOT area 08/20/2020 3.1  cm2 Final    LVOT stroke volume 08/20/2020 69.71  cm3 Final    AV peak gradient 08/20/2020 17  mmHg Final    PV mean gradient 08/20/2020 2  mmHg Final    E/E' ratio 08/20/2020 8.82  m/s Final    TR Max Rolando 08/20/2020 3.04  m/s Final    MV stenosis pressure 1/2 time 08/20/2020 71  ms Final    LV Systolic Volume 08/20/2020 41.00  mL Final    LV Systolic Volume Index 08/20/2020 21.1  mL/m2 Final    LV Diastolic Volume 08/20/2020 83.00  mL Final    LV Diastolic Volume Index 08/20/2020 42.62  mL/m2 Final    LA Volume Index 08/20/2020 14.9  mL/m2 Final    LV Mass Index 08/20/2020 80  g/m2 Final    RA Major Axis 08/20/2020 3.60  cm Final    Left Atrium Minor Axis 08/20/2020 3.90  cm Final    Left Atrium Major Axis 08/20/2020 4.00  cm Final    RA Width 08/20/2020 3.20  cm Final    Right Atrial Pressure (from IVC) 08/20/2020 3  mmHg Final    TV rest pulmonary artery pressure 08/20/2020 40  mmHg Final   Lab Visit on 08/14/2020   Component Date Value Ref Range Status    WBC 08/14/2020 8.46  3.90 - 12.70 K/uL Final    RBC 08/14/2020 4.34  4.00 - 5.40 M/uL Final    Hemoglobin 08/14/2020 13.0  12.0 - 16.0 g/dL Final    Hematocrit 08/14/2020  42.3  37.0 - 48.5 % Final    Mean Corpuscular Volume 08/14/2020 98  82 - 98 fL Final    Mean Corpuscular Hemoglobin 08/14/2020 30.0  27.0 - 31.0 pg Final    Mean Corpuscular Hemoglobin Conc 08/14/2020 30.7* 32.0 - 36.0 g/dL Final    RDW 08/14/2020 14.1  11.5 - 14.5 % Final    Platelets 08/14/2020 257  150 - 350 K/uL Final    MPV 08/14/2020 10.2  9.2 - 12.9 fL Final    Immature Granulocytes 08/14/2020 0.5  0.0 - 0.5 % Final    Gran # (ANC) 08/14/2020 4.9  1.8 - 7.7 K/uL Final    Immature Grans (Abs) 08/14/2020 0.04  0.00 - 0.04 K/uL Final    Comment: Mild elevation in immature granulocytes is non specific and   can be seen in a variety of conditions including stress response,   acute inflammation, trauma and pregnancy. Correlation with other   laboratory and clinical findings is essential.      Lymph # 08/14/2020 2.7  1.0 - 4.8 K/uL Final    Mono # 08/14/2020 0.6  0.3 - 1.0 K/uL Final    Eos # 08/14/2020 0.2  0.0 - 0.5 K/uL Final    Baso # 08/14/2020 0.06  0.00 - 0.20 K/uL Final    nRBC 08/14/2020 0  0 /100 WBC Final    Gran% 08/14/2020 57.3  38.0 - 73.0 % Final    Lymph% 08/14/2020 32.2  18.0 - 48.0 % Final    Mono% 08/14/2020 6.5  4.0 - 15.0 % Final    Eosinophil% 08/14/2020 2.8  0.0 - 8.0 % Final    Basophil% 08/14/2020 0.7  0.0 - 1.9 % Final    Differential Method 08/14/2020 Automated   Final    Sodium 08/14/2020 144  136 - 145 mmol/L Final    Potassium 08/14/2020 5.0  3.5 - 5.1 mmol/L Final    Chloride 08/14/2020 105  95 - 110 mmol/L Final    CO2 08/14/2020 28  23 - 29 mmol/L Final    Glucose 08/14/2020 128* 70 - 110 mg/dL Final    BUN, Bld 08/14/2020 10  8 - 23 mg/dL Final    Creatinine 08/14/2020 0.8  0.5 - 1.4 mg/dL Final    Calcium 08/14/2020 9.6  8.7 - 10.5 mg/dL Final    Total Protein 08/14/2020 7.0  6.0 - 8.4 g/dL Final    Albumin 08/14/2020 3.9  3.5 - 5.2 g/dL Final    Total Bilirubin 08/14/2020 0.2  0.1 - 1.0 mg/dL Final    Comment: For infants and newborns, interpretation  of results should be based  on gestational age, weight and in agreement with clinical  observations.  Premature Infant recommended reference ranges:  Up to 24 hours.............<8.0 mg/dL  Up to 48 hours............<12.0 mg/dL  3-5 days..................<15.0 mg/dL  6-29 days.................<15.0 mg/dL      Alkaline Phosphatase 08/14/2020 86  55 - 135 U/L Final    AST 08/14/2020 20  10 - 40 U/L Final    ALT 08/14/2020 20  10 - 44 U/L Final    Anion Gap 08/14/2020 11  8 - 16 mmol/L Final    eGFR if African American 08/14/2020 >60.0  >60 mL/min/1.73 m^2 Final    eGFR if non African American 08/14/2020 >60.0  >60 mL/min/1.73 m^2 Final    Comment: Calculation used to obtain the estimated glomerular filtration  rate (eGFR) is the CKD-EPI equation.       Cholesterol 08/14/2020 146  120 - 199 mg/dL Final    Comment: The National Cholesterol Education Program (NCEP) has set the  following guidelines (reference ranges) for Cholesterol:  Optimal.....................<200 mg/dL  Borderline High.............200-239 mg/dL  High........................> or = 240 mg/dL      Triglycerides 08/14/2020 119  30 - 150 mg/dL Final    Comment: The National Cholesterol Education Program (NCEP) has set the  following guidelines (reference values) for triglycerides:  Normal......................<150 mg/dL  Borderline High.............150-199 mg/dL  High........................200-499 mg/dL      HDL 08/14/2020 61  40 - 75 mg/dL Final    Comment: The National Cholesterol Education Program (NCEP) has set the  following guidelines (reference values) for HDL Cholesterol:  Low...............<40 mg/dL  Optimal...........>60 mg/dL      LDL Cholesterol 08/14/2020 61.2* 63.0 - 159.0 mg/dL Final    Comment: The National Cholesterol Education Program (NCEP) has set the  following guidelines (reference values) for LDL Cholesterol:  Optimal.......................<130 mg/dL  Borderline High...............130-159  mg/dL  High..........................160-189 mg/dL  Very High.....................>190 mg/dL      Hdl/Cholesterol Ratio 08/14/2020 41.8  20.0 - 50.0 % Final    Total Cholesterol/HDL Ratio 08/14/2020 2.4  2.0 - 5.0 Final    Non-HDL Cholesterol 08/14/2020 85  mg/dL Final    Comment: Risk category and Non-HDL cholesterol goals:  Coronary heart disease (CHD)or equivalent (10-year risk of CHD >20%):  Non-HDL cholesterol goal     <130 mg/dL  Two or more CHD risk factors and 10-year risk of CHD <= 20%:  Non-HDL cholesterol goal     <160 mg/dL  0 to 1 CHD risk factor:  Non-HDL cholesterol goal     <190 mg/dL      TSH 08/14/2020 2.621  0.400 - 4.000 uIU/mL Final    Magnesium 08/14/2020 1.7  1.6 - 2.6 mg/dL Final    Iron 08/14/2020 41  30 - 160 ug/dL Final    Transferrin 08/14/2020 259  200 - 375 mg/dL Final    TIBC 08/14/2020 383  250 - 450 ug/dL Final    Saturated Iron 08/14/2020 11* 20 - 50 % Final    Ferritin 08/14/2020 105  20.0 - 300.0 ng/mL Final    Vit D, 25-Hydroxy 08/14/2020 17* 30 - 96 ng/mL Final    Comment: Vitamin D deficiency.........<10 ng/mL                              Vitamin D insufficiency......10-29 ng/mL       Vitamin D sufficiency........> or equal to 30 ng/mL  Vitamin D toxicity............>100 ng/mL      BNP 08/14/2020 17  0 - 99 pg/mL Final    Values of less than 100 pg/ml are consistent with non-CHF populations.    Troponin I 08/14/2020 <0.006  0.000 - 0.026 ng/mL Final    Comment: The reference interval for Troponin I represents the 99th percentile   cutoff   for our facility and is consistent with 3rd generation assay   performance.      LD 08/14/2020 120  110 - 260 U/L Final    Results are increased in hemolyzed samples.   Lab Visit on 06/29/2020   Component Date Value Ref Range Status    WBC 06/29/2020 8.36  3.90 - 12.70 K/uL Final    RBC 06/29/2020 4.83  4.00 - 5.40 M/uL Final    Hemoglobin 06/29/2020 14.4  12.0 - 16.0 g/dL Final    Hematocrit 06/29/2020 44.3  37.0 - 48.5 %  Final    Mean Corpuscular Volume 06/29/2020 92  82 - 98 fL Final    Mean Corpuscular Hemoglobin 06/29/2020 29.8  27.0 - 31.0 pg Final    Mean Corpuscular Hemoglobin Conc 06/29/2020 32.5  32.0 - 36.0 g/dL Final    RDW 06/29/2020 13.8  11.5 - 14.5 % Final    Platelets 06/29/2020 221  150 - 350 K/uL Final    MPV 06/29/2020 10.7  9.2 - 12.9 fL Final    Immature Granulocytes 06/29/2020 0.4  0.0 - 0.5 % Final    Gran # (ANC) 06/29/2020 4.0  1.8 - 7.7 K/uL Final    Immature Grans (Abs) 06/29/2020 0.03  0.00 - 0.04 K/uL Final    Comment: Mild elevation in immature granulocytes is non specific and   can be seen in a variety of conditions including stress response,   acute inflammation, trauma and pregnancy. Correlation with other   laboratory and clinical findings is essential.      Lymph # 06/29/2020 3.7  1.0 - 4.8 K/uL Final    Mono # 06/29/2020 0.5  0.3 - 1.0 K/uL Final    Eos # 06/29/2020 0.2  0.0 - 0.5 K/uL Final    Baso # 06/29/2020 0.05  0.00 - 0.20 K/uL Final    nRBC 06/29/2020 0  0 /100 WBC Final    Gran% 06/29/2020 47.2  38.0 - 73.0 % Final    Lymph% 06/29/2020 44.3  18.0 - 48.0 % Final    Mono% 06/29/2020 5.6  4.0 - 15.0 % Final    Eosinophil% 06/29/2020 1.9  0.0 - 8.0 % Final    Basophil% 06/29/2020 0.6  0.0 - 1.9 % Final    Differential Method 06/29/2020 Automated   Final    Sodium 06/29/2020 137  136 - 145 mmol/L Final    Potassium 06/29/2020 5.3* 3.5 - 5.1 mmol/L Final    *No Visible Hemolysis    Chloride 06/29/2020 101  95 - 110 mmol/L Final    CO2 06/29/2020 25  23 - 29 mmol/L Final    Glucose 06/29/2020 107  70 - 110 mg/dL Final    BUN, Bld 06/29/2020 14  8 - 23 mg/dL Final    Creatinine 06/29/2020 0.8  0.5 - 1.4 mg/dL Final    Calcium 06/29/2020 9.9  8.7 - 10.5 mg/dL Final    Total Protein 06/29/2020 8.0  6.0 - 8.4 g/dL Final    Albumin 06/29/2020 4.3  3.5 - 5.2 g/dL Final    Total Bilirubin 06/29/2020 0.2  0.1 - 1.0 mg/dL Final    Comment: For infants and newborns,  interpretation of results should be based  on gestational age, weight and in agreement with clinical  observations.  Premature Infant recommended reference ranges:  Up to 24 hours.............<8.0 mg/dL  Up to 48 hours............<12.0 mg/dL  3-5 days..................<15.0 mg/dL  6-29 days.................<15.0 mg/dL      Alkaline Phosphatase 06/29/2020 110  55 - 135 U/L Final    AST 06/29/2020 22  10 - 40 U/L Final    ALT 06/29/2020 24  10 - 44 U/L Final    Anion Gap 06/29/2020 11  8 - 16 mmol/L Final    eGFR if African American 06/29/2020 >60.0  >60 mL/min/1.73 m^2 Final    eGFR if non African American 06/29/2020 >60.0  >60 mL/min/1.73 m^2 Final    Comment: Calculation used to obtain the estimated glomerular filtration  rate (eGFR) is the CKD-EPI equation.       Hemoglobin A1C 06/29/2020 6.1* 4.0 - 5.6 % Final    Comment: ADA Screening Guidelines:  5.7-6.4%  Consistent with prediabetes  >or=6.5%  Consistent with diabetes  High levels of fetal hemoglobin interfere with the HbA1C  assay. Heterozygous hemoglobin variants (HbS, HgC, etc)do  not significantly interfere with this assay.   However, presence of multiple variants may affect accuracy.      Estimated Avg Glucose 06/29/2020 128  68 - 131 mg/dL Final    TSH 06/29/2020 2.941  0.400 - 4.000 uIU/mL Final    Magnesium 06/29/2020 2.0  1.6 - 2.6 mg/dL Final    Iron 06/29/2020 74  30 - 160 ug/dL Final    Transferrin 06/29/2020 312  200 - 375 mg/dL Final    TIBC 06/29/2020 462* 250 - 450 ug/dL Final    Saturated Iron 06/29/2020 16* 20 - 50 % Final    Ferritin 06/29/2020 178  20.0 - 300.0 ng/mL Final    Vitamin B-12 06/29/2020 376  210 - 950 pg/mL Final    HIV 1/2 Ag/Ab 06/29/2020 Negative  Negative Final    Methlymalonic Acid 06/29/2020 0.59* <0.40 umol/L Final    Comment: If applicable, any drug confirmation testing reported  here was developed and the performance characteristics  determined by [facility]. This   confirmation testing  has not been cleared or approved  by the FDA. The laboratory is regulated under CLIA as  qualified to perform high-complexity testing. This test  is used for patient testing purposes. It should not be  regarded as investigational or for research.  Test performed at [facility],  [address], [place]  [address]     [phone]  Curtis Hale MD  - Medical Director      Lipase 06/29/2020 44  4 - 60 U/L Final   Office Visit on 05/21/2020   Component Date Value Ref Range Status    SARS-CoV2 (COVID-19) Qualitative P* 05/21/2020 Not Detected  Not Detected Final    Comment: This test utilizes a real-time reverse transcription  polymerase chain reaction procedure to amplify and   detect the SARS-CoV-2 RdRp and N genes.    The analytical sensitivity (limit of detection) of   this assay is 100 copies/mL.   A Detected result is considered positive for COVID-19.  This patient is considered infected with the   SARS-CoV-2 virus and is presumed to be contagious.    A Not Detected result means that SARS-CoV-2 RNA is not  present above the limit of detection. It does not rule  out the possibility of COVID-19 and should not be the  sole basis for treatment decisions.  If COVID-19 is   strongly suspected based on clinical and exposure   history,re-testing should be considered.    This test is only for use under Food and Drug   Administration s Emergency Use Authorization (EUA).   Commercial reagents are provided by [employer]  Performance characteristics of the EUA have been   independently verified by [facility]   Department of Pathology and L                           aboratory Medicine.         No results found in the last 24 hours.     TTE 8/20/20    · Concentric left ventricular remodeling.  · Normal left ventricular systolic function. The estimated ejection fraction is 60%.  · Indeterminate left ventricular diastolic function.  · Normal right ventricular systolic  function.  · Normal central venous pressure (3 mmHg).  · The estimated PA systolic pressure is 40 mmHg.       Assessment:     Encounter Diagnoses   Name Primary?    Pre-op examination Yes    Full thickness tear of right subscapularis tendon     Tear of right supraspinatus tendon     Essential hypertension     Pulmonary emphysema, unspecified emphysema type     Pulmonary hypertension     Moderate episode of recurrent major depressive disorder     Mixed hyperlipidemia     Morbid obesity with BMI of 40.0-44.9, adult     Type 2 diabetes mellitus with diabetic polyneuropathy, without long-term current use of insulin     Vitamin D insufficiency     Primary osteoarthritis involving multiple joints     Cigarette nicotine dependence with nicotine-induced disorder     Encounter to discuss test results         Plan:     #Pre-operative examination  -Low risk patient (<1 RF (class I-II ASA) = <1% risk) for intermediate risk surgery 1-5%- orthopedic.     Medication adjustments:   -Continue holding Aspirin, Mobic, and any OTC NSAIDs for 1 week prior to procedure.   -Not on any blood thinners.   -Hold Metformin & Januvia day of surgery (she takes lisinopril at night). Ok to take hctz.  -Continue all other medications including inhalers.  -She will work on cutting back even further on smoking. Lung status is stable today.   -I see no indication to repeat or need for CXR or EKG.  -TTE above    This note includes all recent labs, TTE, and up to date medical history and accurate medication list. I will fax to number provided.     #Bilateral calcific tendinitis with right shoulder OA  #Full thickness tear of distal supraspinatus and subscapularis tendon  -following with dr. Blanco- planning for reverse total shoulder. Scheduled for 8/27/20    #Generalized OA  -had been taking celebrex 200 mg daily & mobic --> advised to stop all NSAIDs at last visit.     #HTN- now controlled  -Lisinopril/HCTZ 20/12.5 mg daily. Stopped  Lasix 40 mg daily at 10/8/18 visit (on HCTZ). Echo within normal limits in March 2017. Stopped Verapamil 240 mg daily.  -12/21/19: reduce lisinopril to 10/12.5 mg daily.  /65. She did not get the med and continued prior dose.  -1/31. /62. STOPPED lisinopril/hctz 20/12.5 mg daily and START lisinopril 20 mg daily. Aim for goal 120/80  -8/14:  systolics. had been taking mobic, excedrin and celebrex. Advised to stop all NSAIDs.   -BP today, 8/24: 138/80 (home readings even better)  -Continue lisinopril 20 mg & HCTZ 25 mg daily  -TTE 8/20/20- see above. RVSP 40, EF 60%.   -labs good.    #Test results  - greater than 50% of the visit spent F2F in reviewing test results with patient and developing plan for further work up and recommendations.   -Reviewed TTE  __________________________________________________________________________    Chronic conditions:     #Lumbar spondylolisthesis  #Primary OA- right knee s/p right TKA  -S/p S/p L4-L5 lumbar fusion pseudoarthrosis revision with rhBMP-2 on 5/9/19 with Dr. Perez & right TKA with Dr. Duarte on 10/9/19  -Now stable per Ortho    #Sympatomatic postsurgical menopause (S/p KIP & BSO)- resolved!  -established with dr. Maurice  -tried paxil and gabapentin without success  - lyrica 100 mg BID (pain control, flashes controlled).  reviewed  -Pap 2/15/19. Dr. Maurice. No malignancy. No reflex to HPV. Repeat in 5 yr.    #MDD  #Insomnia  -Previous agents: Lexapro, Wellbutrin, Zoloft, Prozac, Paxil, Cymbalta, Trazodone, Seroquel  -11/2019: started pristiq 50 mg daily. Increased to 100 mg daily. Seroquel not helping with sleep. Started Hydroxyzine 25 mg nightly, but may increase to 50 mg and take once daily for anxiety if needed. Counseled on sedation.  - doing ok with pristiq 100 mg daily + hydroxyzine to 50 mg qhs and 25 during day     #Nicotine dependence, cigarettes c/b COPD -stable   -Declines cessation clinic referral, chantix, patches, and wellbutrin (caused  hallucinations)  -counseled on risks. Not ready to quit.  -ct chest 1/17/20: normal. No nodules or masses noted .   PFT 1/11/18: FEV1/FEVC 54.59, FEV1 1  -Follows with Pulmonology  -Continue Trelegy daily + Nebulizers q6H     #Subclinical hypothyroidism --> suppressed TSH --> now resolved  TSH 6.97, normal fT4 in 10/2018 --> 2/19: TSH 0.64, normal fT4  -Empirically treated with synthroid 100 mcg daily to assess response of fatigue, however, unchanged.  TSH checked on 9/13/19 due to diarrhea: 0.02. Synthroid stopped. TSH on 12/6/19: normal 2.085  Lab Results   Component Value Date    TSH 2.621 08/14/2020     #NIDDM2 c/b neuropathy  ha1c 7.1% in 2/2019 --> 6.2% on 6/19/19 --> 12/6/19: 5.9%  -Metformin 1,000 mg BID + Januvia 50 mg daily  -prot/cr ratio 5 in 10/12/18 --> 6.8 on 12/6/19  - lyrica 100 mg BID for neuropathy  -foot exam done 1/31/20 normal  -6/29/19: januvia increased to 50 mg daily  Lab Results   Component Value Date    HGBA1C 6.1 (H) 06/29/2020     #b12 deficiency  -b12 level 519 on 10/12/18 --> 278 on 9/13/19 --> 336 + normal MMA on 12/6  -MMA 0.59. started b12 oral daily  Lab Results   Component Value Date    PUDXJYPH43 376 06/29/2020    KLNUDXKT31 336 12/06/2019     #Obesity- BMI 37.07 --> 37.22  --> 40.62 -->43.2 --> 41.99  Body mass index is 41.99 kg/m².  Wt Readings from Last 1 Encounters:   08/24/20 0801 97.5 kg (215 lb)   -4/2019: 227 pounds. 6/19: 209 pounds. 9/2019: 214 --> 11/21: 199 pounds  -12/21: 196 --> 1/31: 197 --> 6/29: 208 lb --> 8/14: 221 lb --> 8/24: 215  -continue healthy diet    #SURJIT, resolved   10/12/18: H/H 11.5/35, MCV 83.5, ferritin 14, iron 31  2/8/19: Iron 44, ferritin 25, H/H 12.9/37.9, MCV 87.1  -S/p IV iron infusion. Can not tolerate oral iron. Previously had restless legs and was treated with requip, but d/c  -Repeat studies improved in 10/2019 and stable 12/2019  Lab Results   Component Value Date    IRON 41 08/14/2020    TIBC 383 08/14/2020    FERRITIN 105 08/14/2020       #GERD-stable  -Nexium 40 mg daily     #HLD  -lipid panel 10/12/18: chol 156, tri 98, hdl 76, ldl 60  -lipid panel 9/13/19: chol 146, tri 143, hdl 46, ldl 71  -Continue Pravastatin 40 mg daily  Lab Results   Component Value Date    CHOL 146 08/14/2020    TRIG 119 08/14/2020    HDL 61 08/14/2020    LDLCALC 61.2 (L) 08/14/2020     #Multiple TIAs  -Stoped ASA 81 mg daily     #Osetopenia  #Vitmain D insufficiency  -Dexa 5/2015 & 12/6/19. Osteopenia. T -1.2  -Vit D 23.6 on 10/12/18 --> 30 on 9/13/19  -vit d increased to 1K daily based on 8/14 labs   -repeat dexa in 2 years  Lab Results   Component Value Date    QEXOPCJA89GU 17 (L) 08/14/2020     #Environmental allergies  -resumed singulair + xyzal daily  -declines allergy referral    #Healthcare Maintenance  -Mammogram 1/31/20: birads 1. Repeat in 2 years.   -Pneumovax 10/2014.   -Shingrix UTD (11/15/18, 3/27/19)  -TDAP 7/31/19  -Colonoscopy 5/18/17. Will get records. Having repeat done soon ([facility])  -Hep C screening negative 10/12/18  -6/29/20: HIV negative    Has mychart. Follow up with labs on 10/7 and appt with me on 10/14/20     Kenzie Loza M.D.    No orders of the defined types were placed in this encounter.

## 2020-08-25 ENCOUNTER — TELEPHONE (OUTPATIENT)
Dept: FAMILY MEDICINE | Facility: CLINIC | Age: 65
End: 2020-08-25

## 2020-08-25 NOTE — TELEPHONE ENCOUNTER
She can take an extra hydroxyzine if needed, but I can't prescribe anything at this point since she has surgery on 8/27 and i've already sent the pre-op clearance. The surgeon, Dr. Blanco may be able to prescribe since she will be the one taking to the OR.

## 2020-08-25 NOTE — TELEPHONE ENCOUNTER
----- Message from Zoila Lou sent at 8/25/2020 11:39 AM CDT -----  Please call pt @ [phone], pt have questions for nurse.

## 2020-08-25 NOTE — TELEPHONE ENCOUNTER
Spoke with patient, patient calling asking for something for her anxiety, states she is having a really hard time with everything that's been going on, please send to [facility], [place]

## 2020-09-02 ENCOUNTER — TELEPHONE (OUTPATIENT)
Dept: FAMILY MEDICINE | Facility: CLINIC | Age: 65
End: 2020-09-02

## 2020-09-02 NOTE — TELEPHONE ENCOUNTER
Spoke with patient, informed patient med was refilled and sent to [facility], receipt confirmed 8/24/20, patient will check with them.

## 2020-09-02 NOTE — TELEPHONE ENCOUNTER
----- Message from Charles Gan sent at 9/2/2020 10:39 AM CDT -----  Regarding: refill  Contact: Fior  Type:  RX Refill Request    Who Called: Fior Esparza  Refill or New Rx:Reill   RX Name and Strength:hydroCHLOROthiazide (HYDRODIURIL) 25 MG tablet  How is the patient currently taking it? (ex. 1XDay): 1 x day   Is this a 30 day or 90 day RX: 90 day   Preferred Pharmacy with phone number:  [facility] [place], [place] - [address] [facility]  [address] [address] [address]  Phone: [phone] Fax: [phone]  Local or Mail Order:local   Ordering Provider:Dr Loza   Would the patient rather a call back or a response via [facility]? Call back   Best Call Back Number:[phone]  Additional Information:

## 2020-09-10 ENCOUNTER — TELEPHONE (OUTPATIENT)
Dept: FAMILY MEDICINE | Facility: CLINIC | Age: 65
End: 2020-09-10

## 2020-09-10 NOTE — TELEPHONE ENCOUNTER
----- Message from Oralia Holcomb sent at 9/10/2020  9:10 AM CDT -----  ..Type:  Patient Returning Call    Who Called: pt   Who Left Message for Patient:  Does the patient know what this is regarding?: unknown   Would the patient rather a call back or a response via [facility]?  Call back   Best Call Back Number:[phone]  Additional Information: Pt is returning  call from nurse

## 2020-09-10 NOTE — TELEPHONE ENCOUNTER
Spoke with patient, patient states she has sore throat, ear ache, cough, fever 99.0 yesterday, just released from hospital from shoulder surgery.

## 2020-09-10 NOTE — TELEPHONE ENCOUNTER
----- Message from Christine Tboar sent at 9/10/2020  7:33 AM CDT -----  .Type:  Needs Medical Advice    Who Called: self  Symptoms (please be specific): severe sorethroat, ears, cough  How long has patient had these symptoms:  2days  Pharmacy name and phone #:  .  [facility] - [place], [place] - [address] [facility]  [address] [address] [address]  Phone: [phone] Fax: [phone]      Would the patient rather a call back or a response via [facility]? call  Best Call Back Number: [phone]  Additional Information:

## 2020-09-28 DIAGNOSIS — E11.42 TYPE 2 DIABETES MELLITUS WITH DIABETIC POLYNEUROPATHY, WITHOUT LONG-TERM CURRENT USE OF INSULIN: ICD-10-CM

## 2020-09-28 DIAGNOSIS — E66.01 CLASS 2 SEVERE OBESITY DUE TO EXCESS CALORIES WITH SERIOUS COMORBIDITY AND BODY MASS INDEX (BMI) OF 37.0 TO 37.9 IN ADULT: ICD-10-CM

## 2020-09-28 DIAGNOSIS — I10 ESSENTIAL HYPERTENSION: ICD-10-CM

## 2020-09-28 DIAGNOSIS — E78.2 MIXED HYPERLIPIDEMIA: Chronic | ICD-10-CM

## 2020-09-28 DIAGNOSIS — F17.219 CIGARETTE NICOTINE DEPENDENCE WITH NICOTINE-INDUCED DISORDER: Chronic | ICD-10-CM

## 2020-09-28 DIAGNOSIS — J30.9 CHRONIC ALLERGIC RHINITIS: Chronic | ICD-10-CM

## 2020-09-28 DIAGNOSIS — F33.1 MODERATE EPISODE OF RECURRENT MAJOR DEPRESSIVE DISORDER: ICD-10-CM

## 2020-09-28 DIAGNOSIS — K21.9 GASTROESOPHAGEAL REFLUX DISEASE, ESOPHAGITIS PRESENCE NOT SPECIFIED: ICD-10-CM

## 2020-09-28 DIAGNOSIS — J43.2 CENTRILOBULAR EMPHYSEMA: ICD-10-CM

## 2020-09-28 RX ORDER — ESOMEPRAZOLE MAGNESIUM 40 MG/1
40 CAPSULE, DELAYED RELEASE ORAL
Qty: 90 CAPSULE | Refills: 1 | Status: SHIPPED | OUTPATIENT
Start: 2020-09-28 | End: 2021-09-28

## 2020-09-28 RX ORDER — LEVOCETIRIZINE DIHYDROCHLORIDE 5 MG/1
5 TABLET, FILM COATED ORAL NIGHTLY
Qty: 90 TABLET | Refills: 1 | Status: SHIPPED | OUTPATIENT
Start: 2020-09-28 | End: 2020-12-28 | Stop reason: SDUPTHER

## 2020-09-28 RX ORDER — OXYCODONE AND ACETAMINOPHEN 10; 325 MG/1; MG/1
1 TABLET ORAL EVERY 4 HOURS PRN
COMMUNITY
Start: 2020-09-17 | End: 2020-10-28

## 2020-09-28 NOTE — TELEPHONE ENCOUNTER
----- Message from Claudia Hernandez sent at 9/28/2020  8:53 AM CDT -----  Contact: self  Type:  RX Refill Request    Who Called: pt  Refill or New Rx:refill  RX Name and Strength:Nexium-40mg/zyxal  How is the patient currently taking it? (ex. 1XDay):1xday/1xday  Is this a 30 day or 90 day Rx:30/30  Preferred Pharmacy with phone number:  [facility] [place], [place] - [address] [facility]  [address] [address] [address]  Phone: [phone] Fax: [phone]  Local or Mail Order:local  Ordering Provider:daylin  Would the patient rather a call back or a response via [facility]? Call back  Best Call Back Number:[phone]  Additional Information: none

## 2020-09-28 NOTE — TELEPHONE ENCOUNTER
Spoke with patient about refills, patient states [facility] says they don't have her refill for Nexium.  Patient wants these to meds to go to local pharmacy ([place]) she is out of nexium

## 2020-09-29 NOTE — TELEPHONE ENCOUNTER
Spoke with Devi pharmacist at [facility], states that xyzal is ready but Nexium is too soon to fill ins wont pay until 10/11/20, unless the patient wants to pay cash. Patient informed, verbalized understanding

## 2020-09-29 NOTE — TELEPHONE ENCOUNTER
----- Message from Ambar Jurado sent at 9/29/2020 11:00 AM CDT -----  Regarding: medication refills  Contact: pt  Caller is requesting a call back regarding her medication being refilled.  Please call back at [phone] .  Caller stated that she call on 9/28/20 and have not heard back from anyone yet regarding her medication. Thanks.

## 2020-09-29 NOTE — TELEPHONE ENCOUNTER
----- Message from Ambar Jurado sent at 9/29/2020 11:00 AM CDT -----  Regarding: medication refills  Contact: pt  Caller is requesting a call back regarding her medication being refilled.  Please call back at [phone] .  Caller stated that she call on 9/28/20 and have not heard back from anyone yet regarding her medication. Thanks.

## 2020-10-09 ENCOUNTER — DOCUMENT SCAN (OUTPATIENT)
Dept: HOME HEALTH SERVICES | Facility: HOSPITAL | Age: 65
End: 2020-10-09
Payer: MEDICARE

## 2020-10-14 ENCOUNTER — TELEPHONE (OUTPATIENT)
Dept: FAMILY MEDICINE | Facility: CLINIC | Age: 65
End: 2020-10-14

## 2020-10-14 NOTE — TELEPHONE ENCOUNTER
----- Message from Krista Huston sent at 10/14/2020  2:04 PM CDT -----  Contact: patient [phone]  Patient would like to speak with the nurse. Patient would not give additional information.

## 2020-10-15 NOTE — TELEPHONE ENCOUNTER
----- Message from Violet Hay sent at 10/15/2020 12:02 PM CDT -----  Regarding: returned call  Contact: pateint  Patient is returning a call, please call them back at  [phone]

## 2020-10-16 ENCOUNTER — OFFICE VISIT (OUTPATIENT)
Dept: FAMILY MEDICINE | Facility: CLINIC | Age: 65
End: 2020-10-16
Payer: MEDICARE

## 2020-10-16 DIAGNOSIS — J06.9 UPPER RESPIRATORY TRACT INFECTION, UNSPECIFIED TYPE: Primary | ICD-10-CM

## 2020-10-16 DIAGNOSIS — R05.9 COUGH: ICD-10-CM

## 2020-10-16 DIAGNOSIS — J02.9 PHARYNGITIS, UNSPECIFIED ETIOLOGY: ICD-10-CM

## 2020-10-16 PROCEDURE — 99213 OFFICE O/P EST LOW 20 MIN: CPT | Mod: 95,,, | Performed by: NURSE PRACTITIONER

## 2020-10-16 PROCEDURE — 99213 PR OFFICE/OUTPT VISIT, EST, LEVL III, 20-29 MIN: ICD-10-PCS | Mod: 95,,, | Performed by: NURSE PRACTITIONER

## 2020-10-16 RX ORDER — AMOXICILLIN 875 MG/1
875 TABLET, FILM COATED ORAL EVERY 12 HOURS
Qty: 14 TABLET | Refills: 0 | Status: SHIPPED | OUTPATIENT
Start: 2020-10-16 | End: 2020-10-23

## 2020-10-16 RX ORDER — GUAIFENESIN/DEXTROMETHORPHAN 100-10MG/5
5 SYRUP ORAL EVERY 6 HOURS PRN
Qty: 354 ML | Refills: 0 | Status: SHIPPED | OUTPATIENT
Start: 2020-10-16 | End: 2020-10-26

## 2020-10-16 NOTE — PROGRESS NOTES
Subjective:       Patient ID: Fior Esparza is a 65 y.o. female.    Chief Complaint: No chief complaint on file.  The patient location is: [place]  The chief complaint leading to consultation is: Cough, sore throat    Visit type: audiovisual    Face to Face time with patient: 15 min  minutes of total time spent on the encounter, which includes face to face time and non-face to face time preparing to see the patient (eg, review of tests), Obtaining and/or reviewing separately obtained history, Documenting clinical information in the electronic or other health record, Independently interpreting results (not separately reported) and communicating results to the patient/family/caregiver, or Care coordination (not separately reported).         Each patient to whom he or she provides medical services by telemedicine is:  (1) informed of the relationship between the physician and patient and the respective role of any other health care provider with respect to management of the patient; and (2) notified that he or she may decline to receive medical services by telemedicine and may withdraw from such care at any time.    Notes:     Sore Throat   This is a new problem. The current episode started yesterday. The problem has been unchanged. The pain is worse on the right side. There has been no fever. The pain is at a severity of 5/10. The pain is moderate. Associated symptoms include coughing, ear discharge, ear pain, headaches and trouble swallowing. Pertinent negatives include no abdominal pain, congestion, diarrhea, drooling, hoarse voice, plugged ear sensation, neck pain, shortness of breath, stridor, swollen glands or vomiting. She has had no exposure to strep or mono. She has tried NSAIDs for the symptoms. The treatment provided no relief.   Declines COVID-19 testing.  Past Medical History:   Diagnosis Date    Chronic allergic rhinitis 11/21/2019    COPD with emphysema 11/21/2019    Essential hypertension  2019    Localized osteoarthritis of right shoulder 2020    Mixed hyperlipidemia 2019    Moderate episode of recurrent major depressive disorder 2019    Osteopenia of multiple sites  2019    Pulmonary hypertension 2020    Spondylolisthesis of lumbar region 2019    S/p l4-l5 fusion     Subclinical hypothyroidism     Symptomatic postsurgical menopause 2019    S/p KIP BSO    TIA (transient ischemic attack)     Type 2 diabetes mellitus with diabetic polyneuropathy, without long-term current use of insulin 2019    Vitamin D insufficiency 8/15/2020     Social History     Socioeconomic History    Marital status:      Spouse name: Not on file    Number of children: Not on file    Years of education: Not on file    Highest education level: Not on file   Occupational History    Not on file   Social Needs    Financial resource strain: Not on file    Food insecurity     Worry: Not on file     Inability: Not on file    Transportation needs     Medical: Not on file     Non-medical: Not on file   Tobacco Use    Smoking status: Former Smoker     Packs/day: 2.00     Years: 50.00     Pack years: 100.00     Types: Cigarettes     Quit date: 6/15/2020     Years since quittin.3    Smokeless tobacco: Never Used   Substance and Sexual Activity    Alcohol use: Not Currently    Drug use: Never    Sexual activity: Not Currently     Partners: Male     Birth control/protection: Surgical, Post-menopausal   Lifestyle    Physical activity     Days per week: Not on file     Minutes per session: Not on file    Stress: Only a little   Relationships    Social connections     Talks on phone: Not on file     Gets together: Not on file     Attends [employer] service: Not on file     Active member of club or organization: Not on file     Attends meetings of clubs or organizations: Not on file     Relationship status: Not on file   Other Topics Concern    Not on file    Social History Narrative    Not on file     Past Surgical History:   Procedure Laterality Date    BACK SURGERY      CARPAL TUNNEL RELEASE Bilateral 04/17/2015, 11/7/14    dr. gomez    CATARACT EXTRACTION, BILATERAL  09/2018    CHOLECYSTECTOMY      EXTREME LATERAL INTERBODY FUSION (XLIF) OF SPINE  07/07/15, 5/9/19    elías    GANGLION CYST EXCISION Right     right wrist    TOTAL ABDOMINAL HYSTERECTOMY W/ BILATERAL SALPINGOOPHORECTOMY      TOTAL KNEE ARTHROPLASTY Right 10/2019    dr. covington (revision of prior)       Review of Systems   Constitutional: Negative.    HENT: Positive for ear discharge, ear pain, sore throat and trouble swallowing. Negative for nasal congestion, drooling and hoarse voice.    Eyes: Negative.    Respiratory: Positive for cough. Negative for shortness of breath and stridor.    Cardiovascular: Negative.    Gastrointestinal: Negative.  Negative for abdominal pain, diarrhea and vomiting.   Endocrine: Negative.    Genitourinary: Negative.    Musculoskeletal: Negative.  Negative for neck pain.   Integumentary:  Negative.   Allergic/Immunologic: Negative.    Neurological: Positive for headaches.   Psychiatric/Behavioral: Negative.          Objective:      Physical Exam  Constitutional:       Appearance: Normal appearance.   Neurological:      Mental Status: She is alert.         Assessment:       1. Upper respiratory tract infection, unspecified type    2. Cough    3. Pharyngitis, unspecified etiology        Plan:           Diagnoses and all orders for this visit:    Upper respiratory tract infection, unspecified type  Cough  Pharyngitis, unspecified etiology  -     amoxicillin (AMOXIL) 875 MG tablet; Take 1 tablet (875 mg total) by mouth every 12 (twelve) hours. for 7 days  -     (Magic mouthwash) 1:1:1 Benadryl 12.5mg/5ml liq, aluminum & magnesium hydroxide-simehticone (Maalox), lidocaine viscous 2%; Swish and spit 5 mLs every 8 (eight) hours as needed. Gargle and spit. Do not  swallow  -     dextromethorphan-guaifenesin  mg/5 ml (ROBITUSSIN-DM)  mg/5 mL liquid; Take 5 mLs by mouth every 6 (six) hours as needed. Diabetic tussin  Hydrate well  Rest  Report to ER immediately if symptoms worsen

## 2020-10-28 ENCOUNTER — OFFICE VISIT (OUTPATIENT)
Dept: FAMILY MEDICINE | Facility: CLINIC | Age: 65
End: 2020-10-28
Payer: MEDICARE

## 2020-10-28 VITALS
DIASTOLIC BLOOD PRESSURE: 74 MMHG | OXYGEN SATURATION: 97 % | BODY MASS INDEX: 43 KG/M2 | WEIGHT: 219 LBS | SYSTOLIC BLOOD PRESSURE: 138 MMHG | TEMPERATURE: 98 F | HEIGHT: 60 IN | HEART RATE: 82 BPM

## 2020-10-28 DIAGNOSIS — F33.41 RECURRENT MAJOR DEPRESSIVE DISORDER, IN PARTIAL REMISSION: ICD-10-CM

## 2020-10-28 DIAGNOSIS — E78.5 HYPERLIPIDEMIA ASSOCIATED WITH TYPE 2 DIABETES MELLITUS: Chronic | ICD-10-CM

## 2020-10-28 DIAGNOSIS — I15.2 HYPERTENSION ASSOCIATED WITH DIABETES: ICD-10-CM

## 2020-10-28 DIAGNOSIS — J43.9 PULMONARY EMPHYSEMA, UNSPECIFIED EMPHYSEMA TYPE: Chronic | ICD-10-CM

## 2020-10-28 DIAGNOSIS — Z01.00 DIABETIC EYE EXAM: ICD-10-CM

## 2020-10-28 DIAGNOSIS — E55.9 VITAMIN D INSUFFICIENCY: ICD-10-CM

## 2020-10-28 DIAGNOSIS — E53.8 B12 DEFICIENCY: ICD-10-CM

## 2020-10-28 DIAGNOSIS — Z01.818 PRE-OP EXAMINATION: Primary | ICD-10-CM

## 2020-10-28 DIAGNOSIS — E11.59 HYPERTENSION ASSOCIATED WITH TYPE 2 DIABETES MELLITUS: ICD-10-CM

## 2020-10-28 DIAGNOSIS — I15.2 HYPERTENSION ASSOCIATED WITH TYPE 2 DIABETES MELLITUS: ICD-10-CM

## 2020-10-28 DIAGNOSIS — Z23 NEED FOR INFLUENZA VACCINATION: ICD-10-CM

## 2020-10-28 DIAGNOSIS — M48.062 SPINAL STENOSIS OF LUMBAR REGION WITH NEUROGENIC CLAUDICATION: ICD-10-CM

## 2020-10-28 DIAGNOSIS — M43.16 SPONDYLOLISTHESIS OF LUMBAR REGION: Chronic | ICD-10-CM

## 2020-10-28 DIAGNOSIS — K58.2 IRRITABLE BOWEL SYNDROME WITH BOTH CONSTIPATION AND DIARRHEA: ICD-10-CM

## 2020-10-28 DIAGNOSIS — E11.9 DIABETIC EYE EXAM: ICD-10-CM

## 2020-10-28 DIAGNOSIS — J84.10 CALCIFIED GRANULOMA OF LUNG: ICD-10-CM

## 2020-10-28 DIAGNOSIS — K21.9 GASTROESOPHAGEAL REFLUX DISEASE, UNSPECIFIED WHETHER ESOPHAGITIS PRESENT: ICD-10-CM

## 2020-10-28 DIAGNOSIS — R05.9 COUGH: ICD-10-CM

## 2020-10-28 DIAGNOSIS — E66.01 MORBID OBESITY WITH BMI OF 40.0-44.9, ADULT: ICD-10-CM

## 2020-10-28 DIAGNOSIS — I27.20 PULMONARY HYPERTENSION: Chronic | ICD-10-CM

## 2020-10-28 DIAGNOSIS — E11.42 TYPE 2 DIABETES MELLITUS WITH DIABETIC POLYNEUROPATHY, WITHOUT LONG-TERM CURRENT USE OF INSULIN: Chronic | ICD-10-CM

## 2020-10-28 DIAGNOSIS — E11.59 HYPERTENSION ASSOCIATED WITH DIABETES: ICD-10-CM

## 2020-10-28 DIAGNOSIS — E11.69 HYPERLIPIDEMIA ASSOCIATED WITH TYPE 2 DIABETES MELLITUS: Chronic | ICD-10-CM

## 2020-10-28 PROCEDURE — 90686 IIV4 VACC NO PRSV 0.5 ML IM: CPT | Mod: PBBFAC,PO

## 2020-10-28 PROCEDURE — 99215 OFFICE O/P EST HI 40 MIN: CPT | Mod: S$GLB,,, | Performed by: INTERNAL MEDICINE

## 2020-10-28 PROCEDURE — 99999 PR PBB SHADOW E&M-EST. PATIENT-LVL V: CPT | Mod: PBBFAC,,, | Performed by: INTERNAL MEDICINE

## 2020-10-28 PROCEDURE — 99215 PR OFFICE/OUTPT VISIT, EST, LEVL V, 40-54 MIN: ICD-10-PCS | Mod: S$GLB,,, | Performed by: INTERNAL MEDICINE

## 2020-10-28 PROCEDURE — 99215 OFFICE O/P EST HI 40 MIN: CPT | Mod: PBBFAC,PO,25 | Performed by: INTERNAL MEDICINE

## 2020-10-28 PROCEDURE — 99999 PR PBB SHADOW E&M-EST. PATIENT-LVL V: ICD-10-PCS | Mod: PBBFAC,,, | Performed by: INTERNAL MEDICINE

## 2020-10-28 RX ORDER — CODEINE PHOSPHATE AND GUAIFENESIN 10; 100 MG/5ML; MG/5ML
10 SOLUTION ORAL EVERY 8 HOURS PRN
Qty: 118 ML | Refills: 0 | Status: SHIPPED | OUTPATIENT
Start: 2020-10-28 | End: 2020-11-07

## 2020-10-28 RX ORDER — DICYCLOMINE HYDROCHLORIDE 10 MG/1
10 CAPSULE ORAL 2 TIMES DAILY
Qty: 60 CAPSULE | Refills: 0 | Status: SHIPPED | OUTPATIENT
Start: 2020-10-28 | End: 2020-11-20 | Stop reason: SDUPTHER

## 2020-10-28 NOTE — PROGRESS NOTES
Subjective:      Date: 10/28/20    Patient ID: Fior Esparza is a 65 y.o. female.    Chief Complaint: Pre-op Exam and Back Pain    HPI     Ms. Esparza is a 65F here for preop examination. The patient's last visit with me was on 8/24/2020. See below preop section for review of events since last visit.     Surgeon:  -Dr. Wellington Holt    Surgery:   -Coflex L2-L3 and L3-L4 Laminectomy    Date:   -TBD    Fax:   [phone]    Active Cardiac Conditions:   -MI: no  -Decompensated Heart Failure: no  -Significant Arrhythmia: no  -Severe AS or symptomatic MS: no    Clinical Risk Factors:  -History of Ischemic heart disease: no  -History of CHF: no (see echo attached)  -History of CVA or TIA: no  -Diabetes on insulin: no  -Renal insufficiency (cr >2): no    Functional Capacity:  -4-10 mets: limited by back and leg pain, but cleans her home, cooks, and is independent. Has support at home.    History or postoperative complications:   -Malignant hyperthermia: no  -DVT or PE: no  -Anesthesia complications: no  -History of bleeding disorder: no    Medications:   -ASA: no- not listed today. Was on prior to shoulder surgery.  -Anticoagulants: no  -OTC medications/NSAIDs: none listed --> she stopped excedrin, mobic, and Celebrex at time of shoulder surgery.     Tobacco use:  -Smoker: down to 1-2 cigarettes per day. Previously was 2 ppd until 2016.    ROS:   -CP, SOB, headaches, fevers/chills, palpitations: NO      _______________________________________________________    Since our last visit she underwent reverse total shoulder on 08/27/20 with Dr. Blanco. She proudly shows me her scar today, which has healed nicely. She has more ROM and functionality of the shoulder. She is now unfortunately having more low back pain. She has had a number of lumbar procedures, but the pain is worsening. She saw dr. holt and had MRI repeated showing stenosis. There is concern for stenosis and neurogenic claudication, therefore, she will be  undergoing procedure as noted above, but no definitive date set yet. Labs have been scheduled here. Medications reviewed. Her daughter and grandson will be moving soon. She still has her  at home. Still smoking and getting over URI. Requests cough medication- given smallest quantity of cheratussin as  revealed percocet rx and lyrica prescribed by me for hot flashes and neuropathy. Mood is stable. Flu shot given today. Feels she is ready to undergo surgery. She's had at least 3 orthopedic procedure over the last 2 years. No other concerns today. Medications refilled and up to date. Only outstanding health maintenance is dilated eye exam.     Review of patient's allergies indicates:   Allergen Reactions    Contrast media Hives, Shortness Of Breath, Itching and Swelling    Iodinated contrast media Hives, Itching, Shortness Of Breath and Swelling       Current Outpatient Medications:     albuterol (PROAIR HFA) 90 mcg/actuation inhaler, Inhale 2 puffs into the lungs every 6 (six) hours as needed for Wheezing., Disp: 48 g, Rfl: 3    albuterol-ipratropium (DUO-NEB) 2.5 mg-0.5 mg/3 mL nebulizer solution, Take 3 mLs by nebulization every 6 (six) hours as needed for Wheezing. Rescue, Disp: 1 Box, Rfl: 6    azelastine (ASTELIN) 137 mcg (0.1 %) nasal spray, 2 sprays (274 mcg total) by Nasal route 2 (two) times daily., Disp: 30 mL, Rfl: 2    blood sugar diagnostic Strp, Use to test sugars 2-3 times daily, Disp: 200 each, Rfl: 3    blood-glucose meter kit, Use as directed to check blood sugar qid., Disp: , Rfl:     cholecalciferol, vitamin D3, (VITAMIN D3) 25 mcg (1,000 unit) capsule, Take 1 capsule (1,000 Units total) by mouth once daily., Disp: 90 capsule, Rfl: 1    cyanocobalamin (VITAMIN B-12) 1000 MCG tablet, Take 1 tablet (1,000 mcg total) by mouth once daily., Disp: 90 tablet, Rfl: 1    desvenlafaxine succinate (PRISTIQ) 100 MG Tb24, Take 1 tablet (100 mg total) by mouth once daily., Disp: 90 tablet,  Rfl: 1    dicyclomine (BENTYL) 10 MG capsule, Take 1 capsule (10 mg total) by mouth 2 (two) times daily., Disp: 60 capsule, Rfl: 0    esomeprazole (NEXIUM) 40 MG capsule, Take 1 capsule (40 mg total) by mouth before breakfast., Disp: 90 capsule, Rfl: 1    fluticasone propionate (FLONASE) 50 mcg/actuation nasal spray, Use 2 puffs in each nostril q day., Disp: 48 g, Rfl: 1    fluticasone-umeclidin-vilanter (TRELEGY ELLIPTA) 100-62.5-25 mcg DsDv, Inhale 1 puff into the lungs once daily., Disp: 180 each, Rfl: 3    guaifenesin-codeine 100-10 mg/5 ml (TUSSI-ORGANIDIN NR)  mg/5 mL syrup, Take 10 mLs by mouth every 8 (eight) hours as needed for Cough., Disp: 118 mL, Rfl: 0    hydroCHLOROthiazide (HYDRODIURIL) 25 MG tablet, Take 1 tablet (25 mg total) by mouth once daily., Disp: 90 tablet, Rfl: 1    hydrOXYzine HCL (ATARAX) 25 MG tablet, Take 1 tablet (25 mg total) by mouth 2 (two) times a day., Disp: 30 tablet, Rfl: 0    lancets Misc, Use to test sugar 3 times daily, Disp: 200 each, Rfl: 3    levocetirizine (XYZAL) 5 MG tablet, Take 1 tablet (5 mg total) by mouth every evening., Disp: 90 tablet, Rfl: 1    lisinopriL (PRINIVIL,ZESTRIL) 20 MG tablet, Take 1 tablet (20 mg total) by mouth once daily., Disp: 90 tablet, Rfl: 1    metFORMIN (GLUCOPHAGE) 1000 MG tablet, Take 1 tablet (1,000 mg total) by mouth 2 (two) times daily with meals., Disp: 180 tablet, Rfl: 1    montelukast (SINGULAIR) 10 mg tablet, TAKE 1 TABLET EVERY EVENING, Disp: 90 tablet, Rfl: 0    ondansetron (ZOFRAN) 8 MG tablet, Take 1 tablet (8 mg total) by mouth every 8 (eight) hours as needed for Nausea., Disp: 30 tablet, Rfl: 3    pravastatin (PRAVACHOL) 40 MG tablet, Take 1 tablet (40 mg total) by mouth once daily., Disp: 90 tablet, Rfl: 1    pregabalin (LYRICA) 100 MG capsule, Take 1 capsule (100 mg total) by mouth 2 (two) times daily., Disp: 60 capsule, Rfl: 3    SITagliptin (JANUVIA) 50 MG Tab, Take 1 tablet (50 mg total) by mouth once  daily., Disp: 90 tablet, Rfl: 1    Past Medical History:   Diagnosis Date    Bilateral primary osteoarthritis of knee     Chronic allergic rhinitis 11/21/2019    COPD with emphysema 11/21/2019    Diabetes mellitus     Essential hypertension 11/21/2019    Full thickness tear of right subscapularis tendon 8/14/2020    Iron deficiency anemia     required IV iron infusion [id number]    Localized osteoarthritis of right shoulder 6/29/2020    Mixed hyperlipidemia 11/21/2019    Osteopenia of multiple sites  11/21/2019    Pulmonary hypertension 8/24/2020    Spondylolisthesis of lumbar region 11/21/2019    S/p l4-l5 fusion     Subclinical hypothyroidism     Symptomatic postsurgical menopause 11/21/2019    S/p KIP BSO    Tear of right supraspinatus tendon 8/14/2020    TIA (transient ischemic attack)     Type 2 diabetes mellitus with diabetic polyneuropathy, without long-term current use of insulin 11/21/2019    Vitamin D insufficiency 8/15/2020     Past Surgical History:   Procedure Laterality Date    BACK SURGERY      CARPAL TUNNEL RELEASE Bilateral 04/17/2015, 11/7/14    dr. gomez    CATARACT EXTRACTION, BILATERAL  09/2018    CHOLECYSTECTOMY      EXTREME LATERAL INTERBODY FUSION (XLIF) OF SPINE  07/07/15, 5/9/19    elías    GANGLION CYST EXCISION Right     right wrist    REVERSE TOTAL SHOULDER ARTHROPLASTY Right 08/27/2020    dr. clemens. full thickness tear of distal supraspinatus & supscapularis tendon & bilateral calcific tendonitis of right shoulder    TOTAL ABDOMINAL HYSTERECTOMY W/ BILATERAL SALPINGOOPHORECTOMY      TOTAL KNEE ARTHROPLASTY Right 10/2019    dr. covington (revision of prior)     Family History   Problem Relation Age of Onset    Rheum arthritis Mother     Diabetes Father     Breast cancer Maternal Aunt     Breast cancer Sister     Breast cancer Maternal Aunt      Social History     Socioeconomic History    Marital status:      Spouse name: Not on file    Number  of children: Not on file    Years of education: Not on file    Highest education level: Not on file   Occupational History    Not on file   Social Needs    Financial resource strain: Not on file    Food insecurity     Worry: Not on file     Inability: Not on file    Transportation needs     Medical: Not on file     Non-medical: Not on file   Tobacco Use    Smoking status: Former Smoker     Packs/day: 2.00     Years: 50.00     Pack years: 100.00     Types: Cigarettes     Quit date: 6/15/2020     Years since quittin.3    Smokeless tobacco: Never Used   Substance and Sexual Activity    Alcohol use: Not Currently    Drug use: Never    Sexual activity: Not Currently     Partners: Male     Birth control/protection: Surgical, Post-menopausal   Lifestyle    Physical activity     Days per week: Not on file     Minutes per session: Not on file    Stress: Only a little   Relationships    Social connections     Talks on phone: Not on file     Gets together: Not on file     Attends [employer] service: Not on file     Active member of club or organization: Not on file     Attends meetings of clubs or organizations: Not on file     Relationship status: Not on file   Other Topics Concern    Not on file   Social History Narrative    Not on file      Review of Systems   Constitutional: Positive for activity change (limited by back pain). Negative for chills, fatigue, fever and unexpected weight change.   HENT: Negative for congestion.    Eyes: Negative for visual disturbance.   Respiratory: Positive for cough (baseline) and wheezing (stable- copd). Negative for shortness of breath.         Stable- copd     Cardiovascular: Negative for chest pain, palpitations and leg swelling.   Gastrointestinal: Negative for abdominal pain and nausea.   Endocrine: Negative for cold intolerance and heat intolerance.   Genitourinary: Negative.  Negative for dysuria.   Musculoskeletal: Positive for arthralgias and back pain. Negative  for myalgias.   Skin: Negative for rash.   Allergic/Immunologic: Negative for environmental allergies (controlled).   Neurological: Negative for dizziness, syncope and headaches.   Hematological: Does not bruise/bleed easily.   Psychiatric/Behavioral: Negative for dysphoric mood (stable), sleep disturbance and suicidal ideas. The patient is not nervous/anxious.           Objective:     Body mass index is 42.77 kg/m².  /74   Pulse 82   Temp 97.5 °F (36.4 °C)   Ht 5' (1.524 m)   Wt 99.3 kg (219 lb)   SpO2 97%   BMI 42.77 kg/m²       Physical Exam  Vitals signs and nursing note reviewed.   Constitutional:       General: She is not in acute distress.     Appearance: Normal appearance. She is well-developed. She is not diaphoretic.   HENT:      Head: Normocephalic and atraumatic.      Nose: Nose normal.      Mouth/Throat:      Mouth: Mucous membranes are moist.   Eyes:      Conjunctiva/sclera: Conjunctivae normal.   Cardiovascular:      Rate and Rhythm: Normal rate and regular rhythm.      Heart sounds: Normal heart sounds. No murmur.   Pulmonary:      Effort: Pulmonary effort is normal.      Breath sounds: Normal breath sounds.      Comments: 97% pulse ox. No wheezing.   Abdominal:      General: Bowel sounds are normal.      Palpations: Abdomen is soft.   Musculoskeletal:         General: No tenderness.      Right lower leg: No edema.      Left lower leg: No edema.      Comments: No swelling in lower extremities     Well healed scar over her right anterior shoulder from recent surgery.   Lymphadenopathy:      Cervical: No cervical adenopathy.   Skin:     General: Skin is warm and dry.      Capillary Refill: Capillary refill takes 2 to 3 seconds.   Neurological:      General: No focal deficit present.      Mental Status: She is alert and oriented to person, place, and time. Mental status is at baseline.      Sensory: No sensory deficit.   Psychiatric:         Mood and Affect: Mood normal.         Behavior:  "Behavior normal.         Thought Content: Thought content normal.         Judgment: Judgment normal.               Hospital Outpatient Visit on 08/20/2020   Component Date Value Ref Range Status    Echo EF Estimated 08/20/2020 50  % Final    IVS 08/20/2020 0.88  0.6 - 1.1 cm Final    LVIDd 08/20/2020 4.29  3.5 - 6.0 cm Final    LVIDs 08/20/2020 3.20  2.1 - 4.0 cm Final    LVOT diameter 08/20/2020 1.98  cm Final    Posterior Wall 08/20/2020 1.25* 0.6 - 1.1 cm Final    IVRT 08/20/2020 152.25  ms Final    AV LVOT peak gradient 08/20/2020 4  mmHg Final    Triscuspid Valve Regurgitation Pea* 08/20/2020 37  mmHg Final    LVOT peak rolando 08/20/2020 1.00  m/s Final    LVOT peak VTI 08/20/2020 22.65  cm Final    RVDD 08/20/2020 1.68  cm Final    RVOT peak rolando 08/20/2020 0.93  m/s Final    RVOT peak VTI 08/20/2020 16.47  cm Final    LA size 08/20/2020 3.21  cm Final    AV mean gradient 08/20/2020 8  mmHg Final    Ao peak rolando 08/20/2020 2.06  m/s Final    Ao VTI 08/20/2020 35.10  cm Final    MV Peak A Rolando 08/20/2020 1.03  m/s Final    E wave decelartion time 08/20/2020 243.98  ms Final    MV Peak E Rolando 08/20/2020 0.75  m/s Final    E/A ratio 08/20/2020 0.73   Final    MV valve area p 1/2 method 08/20/2020 3.10  cm2 Final    PV PEAK VELOCITY 08/20/2020 1.24  m/s Final    Proximal aorta 08/20/2020 3.19  cm Final    Pulm vein "A" wave 08/20/2020 117.65  ms Final    PV Peak D Rolando 08/20/2020 0.59  m/s Final    PV Peak S Rolando 08/20/2020 0.76  m/s Final    BSA 08/20/2020 2.06  m2 Final    TDI SEPTAL 08/20/2020 0.10  m/s Final    LV LATERAL E/E' RATIO 08/20/2020 10.71  m/s Final    LV SEPTAL E/E' RATIO 08/20/2020 7.50  m/s Final    LA WIDTH 08/20/2020 2.70  cm Final    TDI LATERAL 08/20/2020 0.07  m/s Final    FS 08/20/2020 25  28 - 44 % Final    LA volume 08/20/2020 29.09  cm3 Final    Sinus 08/20/2020 3.00  cm Final    STJ 08/20/2020 3.10  cm Final    LV mass 08/20/2020 155.07  g Final    TAPSE " 08/20/2020 1.90  cm Final    Left Ventricle Relative Wall Thick* 08/20/2020 0.58  cm Final    AV valve area 08/20/2020 1.99  cm2 Final    AV Velocity Ratio 08/20/2020 0.49   Final    AV index (prosthetic) 08/20/2020 0.65   Final    Mean e' 08/20/2020 0.09  m/s Final    Pulm vein S/D ratio 08/20/2020 1.29   Final    LVOT area 08/20/2020 3.1  cm2 Final    LVOT stroke volume 08/20/2020 69.71  cm3 Final    AV peak gradient 08/20/2020 17  mmHg Final    PV mean gradient 08/20/2020 2  mmHg Final    E/E' ratio 08/20/2020 8.82  m/s Final    TR Max Rolando 08/20/2020 3.04  m/s Final    MV stenosis pressure 1/2 time 08/20/2020 71  ms Final    LV Systolic Volume 08/20/2020 41.00  mL Final    LV Systolic Volume Index 08/20/2020 21.1  mL/m2 Final    LV Diastolic Volume 08/20/2020 83.00  mL Final    LV Diastolic Volume Index 08/20/2020 42.62  mL/m2 Final    LA Volume Index 08/20/2020 14.9  mL/m2 Final    LV Mass Index 08/20/2020 80  g/m2 Final    RA Major Axis 08/20/2020 3.60  cm Final    Left Atrium Minor Axis 08/20/2020 3.90  cm Final    Left Atrium Major Axis 08/20/2020 4.00  cm Final    RA Width 08/20/2020 3.20  cm Final    Right Atrial Pressure (from IVC) 08/20/2020 3  mmHg Final    TV rest pulmonary artery pressure 08/20/2020 40  mmHg Final   Lab Visit on 08/14/2020   Component Date Value Ref Range Status    WBC 08/14/2020 8.46  3.90 - 12.70 K/uL Final    RBC 08/14/2020 4.34  4.00 - 5.40 M/uL Final    Hemoglobin 08/14/2020 13.0  12.0 - 16.0 g/dL Final    Hematocrit 08/14/2020 42.3  37.0 - 48.5 % Final    MCV 08/14/2020 98  82 - 98 fL Final    MCH 08/14/2020 30.0  27.0 - 31.0 pg Final    MCHC 08/14/2020 30.7* 32.0 - 36.0 g/dL Final    RDW 08/14/2020 14.1  11.5 - 14.5 % Final    Platelets 08/14/2020 257  150 - 350 K/uL Final    MPV 08/14/2020 10.2  9.2 - 12.9 fL Final    Immature Granulocytes 08/14/2020 0.5  0.0 - 0.5 % Final    Gran # (ANC) 08/14/2020 4.9  1.8 - 7.7 K/uL Final    Immature  Grans (Abs) 08/14/2020 0.04  0.00 - 0.04 K/uL Final    Comment: Mild elevation in immature granulocytes is non specific and   can be seen in a variety of conditions including stress response,   acute inflammation, trauma and pregnancy. Correlation with other   laboratory and clinical findings is essential.      Lymph # 08/14/2020 2.7  1.0 - 4.8 K/uL Final    Mono # 08/14/2020 0.6  0.3 - 1.0 K/uL Final    Eos # 08/14/2020 0.2  0.0 - 0.5 K/uL Final    Baso # 08/14/2020 0.06  0.00 - 0.20 K/uL Final    nRBC 08/14/2020 0  0 /100 WBC Final    Gran % 08/14/2020 57.3  38.0 - 73.0 % Final    Lymph % 08/14/2020 32.2  18.0 - 48.0 % Final    Mono % 08/14/2020 6.5  4.0 - 15.0 % Final    Eosinophil % 08/14/2020 2.8  0.0 - 8.0 % Final    Basophil % 08/14/2020 0.7  0.0 - 1.9 % Final    Differential Method 08/14/2020 Automated   Final    Sodium 08/14/2020 144  136 - 145 mmol/L Final    Potassium 08/14/2020 5.0  3.5 - 5.1 mmol/L Final    Chloride 08/14/2020 105  95 - 110 mmol/L Final    CO2 08/14/2020 28  23 - 29 mmol/L Final    Glucose 08/14/2020 128* 70 - 110 mg/dL Final    BUN 08/14/2020 10  8 - 23 mg/dL Final    Creatinine 08/14/2020 0.8  0.5 - 1.4 mg/dL Final    Calcium 08/14/2020 9.6  8.7 - 10.5 mg/dL Final    Total Protein 08/14/2020 7.0  6.0 - 8.4 g/dL Final    Albumin 08/14/2020 3.9  3.5 - 5.2 g/dL Final    Total Bilirubin 08/14/2020 0.2  0.1 - 1.0 mg/dL Final    Comment: For infants and newborns, interpretation of results should be based  on gestational age, weight and in agreement with clinical  observations.  Premature Infant recommended reference ranges:  Up to 24 hours.............<8.0 mg/dL  Up to 48 hours............<12.0 mg/dL  3-5 days..................<15.0 mg/dL  6-29 days.................<15.0 mg/dL      Alkaline Phosphatase 08/14/2020 86  55 - 135 U/L Final    AST 08/14/2020 20  10 - 40 U/L Final    ALT 08/14/2020 20  10 - 44 U/L Final    Anion Gap 08/14/2020 11  8 - 16 mmol/L Final     eGFR if African American 08/14/2020 >60.0  >60 mL/min/1.73 m^2 Final    eGFR if non African American 08/14/2020 >60.0  >60 mL/min/1.73 m^2 Final    Comment: Calculation used to obtain the estimated glomerular filtration  rate (eGFR) is the CKD-EPI equation.       Cholesterol 08/14/2020 146  120 - 199 mg/dL Final    Comment: The National Cholesterol Education Program (NCEP) has set the  following guidelines (reference ranges) for Cholesterol:  Optimal.....................<200 mg/dL  Borderline High.............200-239 mg/dL  High........................> or = 240 mg/dL      Triglycerides 08/14/2020 119  30 - 150 mg/dL Final    Comment: The National Cholesterol Education Program (NCEP) has set the  following guidelines (reference values) for triglycerides:  Normal......................<150 mg/dL  Borderline High.............150-199 mg/dL  High........................200-499 mg/dL      HDL 08/14/2020 61  40 - 75 mg/dL Final    Comment: The National Cholesterol Education Program (NCEP) has set the  following guidelines (reference values) for HDL Cholesterol:  Low...............<40 mg/dL  Optimal...........>60 mg/dL      LDL Cholesterol 08/14/2020 61.2* 63.0 - 159.0 mg/dL Final    Comment: The National Cholesterol Education Program (NCEP) has set the  following guidelines (reference values) for LDL Cholesterol:  Optimal.......................<130 mg/dL  Borderline High...............130-159 mg/dL  High..........................160-189 mg/dL  Very High.....................>190 mg/dL      HDL/Cholesterol Ratio 08/14/2020 41.8  20.0 - 50.0 % Final    Total Cholesterol/HDL Ratio 08/14/2020 2.4  2.0 - 5.0 Final    Non-HDL Cholesterol 08/14/2020 85  mg/dL Final    Comment: Risk category and Non-HDL cholesterol goals:  Coronary heart disease (CHD)or equivalent (10-year risk of CHD >20%):  Non-HDL cholesterol goal     <130 mg/dL  Two or more CHD risk factors and 10-year risk of CHD <= 20%:  Non-HDL cholesterol goal      <160 mg/dL  0 to 1 CHD risk factor:  Non-HDL cholesterol goal     <190 mg/dL      TSH 08/14/2020 2.621  0.400 - 4.000 uIU/mL Final    Magnesium 08/14/2020 1.7  1.6 - 2.6 mg/dL Final    Iron 08/14/2020 41  30 - 160 ug/dL Final    Transferrin 08/14/2020 259  200 - 375 mg/dL Final    TIBC 08/14/2020 383  250 - 450 ug/dL Final    Saturated Iron 08/14/2020 11* 20 - 50 % Final    Ferritin 08/14/2020 105  20.0 - 300.0 ng/mL Final    Vit D, 25-Hydroxy 08/14/2020 17* 30 - 96 ng/mL Final    Comment: Vitamin D deficiency.........<10 ng/mL                              Vitamin D insufficiency......10-29 ng/mL       Vitamin D sufficiency........> or equal to 30 ng/mL  Vitamin D toxicity............>100 ng/mL      BNP 08/14/2020 17  0 - 99 pg/mL Final    Values of less than 100 pg/ml are consistent with non-CHF populations.    Troponin I 08/14/2020 <0.006  0.000 - 0.026 ng/mL Final    Comment: The reference interval for Troponin I represents the 99th percentile   cutoff   for our facility and is consistent with 3rd generation assay   performance.      LD 08/14/2020 120  110 - 260 U/L Final    Results are increased in hemolyzed samples.   Lab Visit on 06/29/2020   Component Date Value Ref Range Status    WBC 06/29/2020 8.36  3.90 - 12.70 K/uL Final    RBC 06/29/2020 4.83  4.00 - 5.40 M/uL Final    Hemoglobin 06/29/2020 14.4  12.0 - 16.0 g/dL Final    Hematocrit 06/29/2020 44.3  37.0 - 48.5 % Final    MCV 06/29/2020 92  82 - 98 fL Final    MCH 06/29/2020 29.8  27.0 - 31.0 pg Final    MCHC 06/29/2020 32.5  32.0 - 36.0 g/dL Final    RDW 06/29/2020 13.8  11.5 - 14.5 % Final    Platelets 06/29/2020 221  150 - 350 K/uL Final    MPV 06/29/2020 10.7  9.2 - 12.9 fL Final    Immature Granulocytes 06/29/2020 0.4  0.0 - 0.5 % Final    Gran # (ANC) 06/29/2020 4.0  1.8 - 7.7 K/uL Final    Immature Grans (Abs) 06/29/2020 0.03  0.00 - 0.04 K/uL Final    Comment: Mild elevation in immature granulocytes is non specific and    can be seen in a variety of conditions including stress response,   acute inflammation, trauma and pregnancy. Correlation with other   laboratory and clinical findings is essential.      Lymph # 06/29/2020 3.7  1.0 - 4.8 K/uL Final    Mono # 06/29/2020 0.5  0.3 - 1.0 K/uL Final    Eos # 06/29/2020 0.2  0.0 - 0.5 K/uL Final    Baso # 06/29/2020 0.05  0.00 - 0.20 K/uL Final    nRBC 06/29/2020 0  0 /100 WBC Final    Gran % 06/29/2020 47.2  38.0 - 73.0 % Final    Lymph % 06/29/2020 44.3  18.0 - 48.0 % Final    Mono % 06/29/2020 5.6  4.0 - 15.0 % Final    Eosinophil % 06/29/2020 1.9  0.0 - 8.0 % Final    Basophil % 06/29/2020 0.6  0.0 - 1.9 % Final    Differential Method 06/29/2020 Automated   Final    Sodium 06/29/2020 137  136 - 145 mmol/L Final    Potassium 06/29/2020 5.3* 3.5 - 5.1 mmol/L Final    *No Visible Hemolysis    Chloride 06/29/2020 101  95 - 110 mmol/L Final    CO2 06/29/2020 25  23 - 29 mmol/L Final    Glucose 06/29/2020 107  70 - 110 mg/dL Final    BUN 06/29/2020 14  8 - 23 mg/dL Final    Creatinine 06/29/2020 0.8  0.5 - 1.4 mg/dL Final    Calcium 06/29/2020 9.9  8.7 - 10.5 mg/dL Final    Total Protein 06/29/2020 8.0  6.0 - 8.4 g/dL Final    Albumin 06/29/2020 4.3  3.5 - 5.2 g/dL Final    Total Bilirubin 06/29/2020 0.2  0.1 - 1.0 mg/dL Final    Comment: For infants and newborns, interpretation of results should be based  on gestational age, weight and in agreement with clinical  observations.  Premature Infant recommended reference ranges:  Up to 24 hours.............<8.0 mg/dL  Up to 48 hours............<12.0 mg/dL  3-5 days..................<15.0 mg/dL  6-29 days.................<15.0 mg/dL      Alkaline Phosphatase 06/29/2020 110  55 - 135 U/L Final    AST 06/29/2020 22  10 - 40 U/L Final    ALT 06/29/2020 24  10 - 44 U/L Final    Anion Gap 06/29/2020 11  8 - 16 mmol/L Final    eGFR if African American 06/29/2020 >60.0  >60 mL/min/1.73 m^2 Final    eGFR if non African  American 06/29/2020 >60.0  >60 mL/min/1.73 m^2 Final    Comment: Calculation used to obtain the estimated glomerular filtration  rate (eGFR) is the CKD-EPI equation.       Hemoglobin A1C 06/29/2020 6.1* 4.0 - 5.6 % Final    Comment: ADA Screening Guidelines:  5.7-6.4%  Consistent with prediabetes  >or=6.5%  Consistent with diabetes  High levels of fetal hemoglobin interfere with the HbA1C  assay. Heterozygous hemoglobin variants (HbS, HgC, etc)do  not significantly interfere with this assay.   However, presence of multiple variants may affect accuracy.      Estimated Avg Glucose 06/29/2020 128  68 - 131 mg/dL Final    TSH 06/29/2020 2.941  0.400 - 4.000 uIU/mL Final    Magnesium 06/29/2020 2.0  1.6 - 2.6 mg/dL Final    Iron 06/29/2020 74  30 - 160 ug/dL Final    Transferrin 06/29/2020 312  200 - 375 mg/dL Final    TIBC 06/29/2020 462* 250 - 450 ug/dL Final    Saturated Iron 06/29/2020 16* 20 - 50 % Final    Ferritin 06/29/2020 178  20.0 - 300.0 ng/mL Final    Vitamin B-12 06/29/2020 376  210 - 950 pg/mL Final    HIV 1/2 Ag/Ab 06/29/2020 Negative  Negative Final    Methlymalonic Acid 06/29/2020 0.59* <0.40 umol/L Final    Comment: If applicable, any drug confirmation testing reported  here was developed and the performance characteristics  determined by [facility]. This   confirmation testing has not been cleared or approved  by the FDA. The laboratory is regulated under CLIA as  qualified to perform high-complexity testing. This test  is used for patient testing purposes. It should not be  regarded as investigational or for research.  Test performed at [facility],  [address], [place]  [address]     [phone]  Curtis Hale MD  - Medical Director      Lipase 06/29/2020 44  4 - 60 U/L Final   Office Visit on 05/21/2020   Component Date Value Ref Range Status    SARS-CoV2 (COVID-19) Qualitative P* 05/21/2020 Not Detected  Not Detected Final    Comment: This test  utilizes a real-time reverse transcription  polymerase chain reaction procedure to amplify and   detect the SARS-CoV-2 RdRp and N genes.    The analytical sensitivity (limit of detection) of   this assay is 100 copies/mL.   A Detected result is considered positive for COVID-19.  This patient is considered infected with the   SARS-CoV-2 virus and is presumed to be contagious.    A Not Detected result means that SARS-CoV-2 RNA is not  present above the limit of detection. It does not rule  out the possibility of COVID-19 and should not be the  sole basis for treatment decisions.  If COVID-19 is   strongly suspected based on clinical and exposure   history,re-testing should be considered.    This test is only for use under Food and Drug   Administration s Emergency Use Authorization (EUA).   Commercial reagents are provided by [employer].  Performance characteristics of the EUA have been   independently verified by [facility]   Department of Pathology and L                           aboratory Medicine.         No results found in the last 24 hours.     TTE 8/20/20    · Concentric left ventricular remodeling.  · Normal left ventricular systolic function. The estimated ejection fraction is 60%.  · Indeterminate left ventricular diastolic function.  · Normal right ventricular systolic function.  · Normal central venous pressure (3 mmHg).  · The estimated PA systolic pressure is 40 mmHg.       Assessment:     Encounter Diagnoses   Name Primary?    Pre-op examination Yes    Spinal stenosis of lumbar region with neurogenic claudication     Spondylolisthesis of lumbar region     Recurrent major depressive disorder, in partial remission     Pulmonary emphysema, unspecified emphysema type     Hypertension associated with diabetes     B12 deficiency     Type 2 diabetes mellitus with diabetic polyneuropathy, without long-term current use of insulin     Vitamin D insufficiency     Need for influenza  vaccination     Cough     Hypertension associated with type 2 diabetes mellitus     Pulmonary hypertension     Hyperlipidemia associated with type 2 diabetes mellitus     Irritable bowel syndrome with both constipation and diarrhea     Gastroesophageal reflux disease, unspecified whether esophagitis present     Calcified granuloma of lung     Morbid obesity with BMI of 40.0-44.9, adult     Diabetic eye exam         Plan:     #Pre-operative examination  -Low risk patient (<1 RF (class I-II ASA) = <1% risk) for intermediate risk surgery 1-5%- orthopedic.     #Lumbar spondylolisthesis  #Lumbar stenosis with neurogenic claudication  -S/p posterior decompression, foraminotomy, microdiscectomy on 7/07/15 & PLIF L4-5 on 10/24/17,  -S/p L4-L5 lumbar fusion pseudoarthrosis revision with rhBMP-2 on 5/9/19 with Dr. Perez  -MRI 10/20/20: lumbar stenosis L2-3 and L3-L4 along with facet arthropathy at L2-L3 and minor anterolisthesis of L2 on L3   -plan for Coflex L2-L3 and L3-L4 Laminectomy with Dr. Perez    Medication adjustments:   -Confirm prior to surgery that she is not taking any Aspirin, Mobic, and any OTC NSAIDs. Advised her to hold these and any similar NSAIDs 1 week prior to procedure.   -Not on any blood thinners.   -Hold Metformin & Januvia day of surgery (she takes lisinopril at night). Ok to take hctz.  -Continue all other medications including inhalers.  -She will work on cutting back even further on smoking. Lung status is stable today.   -I see no indication to repeat or need for CXR or EKG.  -TTE above  -Labs pending.     This note includes all recent labs, TTE, and up to date medical history and accurate medication list. I will have nursing fax to number provided.   _______________________________________    #HTN- controlled  -Previous meds: Lisinopril/HCTZ 20/12.5 mg & 10/12.5 mg daily, Verapamil 240 mg daily, Lasix 40 mg daily  -1/2020. /63. STOPPED lisinopril/hctz daily and started  lisinopril 20 mg daily.   -8/2020:  systolics. had been taking mobic, excedrin and celebrex, which were stopped and hctz 25 mg daily added.   -TTE 8/20/20- see above. RVSP 40, EF 60%.   -Continue lisinopril 20 mg & HCTZ 25 mg daily    #Symptomatic postsurgical menopause (S/p KIP & BSO)- resolved  -has seen dr. Maurice  -prior meds:  paxil and gabapentin without success  -stable on lyrica 100 mg BID (pain control, flashes controlled).  reviewed  -Pap 2/15/19. Dr. Maurice. No malignancy. No reflex to HPV. Repeat in 5 yr.    #MDD & Insomnia- stable  -Previous agents: Lexapro, Wellbutrin, Zoloft, Prozac, Paxil, Cymbalta, Trazodone, Seroquel  -11/2019: started pristiq & Hydroxyzine.   -10/28: stable on pristiq 100 mg daily + hydroxyzine to 50 mg qhs and 25 during day     #Nicotine dependence, cigarettes c/b COPD -stable   -Declines cessation clinic referral, chantix, patches, and wellbutrin (caused hallucinations)  -Follows with [facility]  -counseled on risks. Not ready to quit.  -ct chest 1/17/20: reviewed.   -PFT 1/11/18: FEV1/FEVC 54.59, FEV1 1  -Continue Trelegy daily + Nebulizers q6H     #Subclinical hypothyroidism --> suppressed TSH --> resolved  -TSH 6.97, normal fT4 10/2018 --> 2/2019: TSH 0.64, normal fT4  -Empirically treated with synthroid 100 mcg daily to assess response of fatigue, however, unchanged.    -TSH 9/2019: 0.02 (was having diarrhea). Synthroid stopped. --> symptoms resolved   -TSH 12/2019: normal 2.085  Lab Results   Component Value Date    TSH 3.535 11/02/2020    TSH 2.621 08/14/2020    TSH 2.941 06/29/2020     #NIDDM2 c/b neuropathy- stable  -ha1c 7.1% 2/2019 --> 6.2% 6/2019 --> 12/2019 5.9%  -Metformin 1,000 mg BID + Januvia 50 mg daily (increased from 25 mg in 6/2019)  -needs updated dilated eye exam     Lab Results   Component Value Date    HGBA1C 6.4 (H) 11/02/2020    HGBA1C 6.1 (H) 06/29/2020    HGBA1C 5.9 (H) 12/06/2019     Lab Results   Component Value Date     CREATININE 0.8 11/02/2020    CREATININE 0.8 08/14/2020    CREATININE 0.8 06/29/2020     Lab Results   Component Value Date    EGFRNONAA >60.0 11/02/2020    EGFRNONAA >60.0 08/14/2020    EGFRNONAA >60.0 06/29/2020     Lab Results   Component Value Date    MICALBCREAT Unable to calculate 11/02/2020    MICALBCREAT 6.8 12/06/2019     Diabetes Management Status    Statin: Taking  ACE/ARB: Taking    Screening or Prevention Patient's value Goal Complete/Controlled?   HgA1C Testing and Control   Lab Results   Component Value Date    HGBA1C 6.4 (H) 11/02/2020      Annually/Less than 8% Yes   Lipid profile : 08/14/2020 Annually Yes   LDL control Lab Results   Component Value Date    LDLCALC 61.2 (L) 08/14/2020    Annually/Less than 100 mg/dl  Yes   Nephropathy screening Lab Results   Component Value Date    LABMICR <2.5 11/02/2020     Lab Results   Component Value Date    PROTEINUA Negative 02/02/2012    Annually Yes   Blood pressure BP Readings from Last 1 Encounters:   10/28/20 138/74    Less than 140/90 Yes   Dilated retinal exam : 08/08/2019 Annually No   Foot exam   : 06/29/2020 Annually Yes     #b12 deficiency- pending labs  -6/2020: MMA 0.59. started oral b12 1K daily  -repeat labs  Lab Results   Component Value Date    WIEXSQYV72 1120 (H) 11/02/2020    KWOHOABB23 376 06/29/2020    ITGXICEF17 336 12/06/2019     Lab Results   Component Value Date    VIIFJOLD75 1120 (H) 11/02/2020    NKPOKFFI40 376 06/29/2020    EKIHGTSL24 336 12/06/2019     Lab Results   Component Value Date    METHLYMALONI 0.59 (H) 06/29/2020    METHLYMALONI 0.24 12/06/2019     #Morbid Obesity-BMI 42.77  -4/2019: 227 lb --> 6/2019: 209 lb --> 12/2019: 196 lb --> 6/2019: 208 lb --> 8/14: 221 lb   -stressed healthy diet  BMI Readings from Last 3 Encounters:   10/28/20 42.77 kg/m²   08/24/20 41.99 kg/m²   08/20/20 43.16 kg/m²     Wt Readings from Last 3 Encounters:   10/28/20 99.3 kg (219 lb)   08/24/20 97.5 kg (215 lb)   08/20/20 100.2 kg (221 lb)      #IBS  #GERD  -stable  -Continue Nexium 40 mg daily & Bentyl BID PRN- refill given today    #HLD- stable  -lipid panel 10/12/18: chol 156, tri 98, hdl 76, ldl 60  -Continue Pravastatin 40 mg daily  Lab Results   Component Value Date    CHOL 146 08/14/2020    CHOL 146 09/13/2019   ,   Lab Results   Component Value Date    HDL 61 08/14/2020    HDL 46 09/13/2019   ,   Lab Results   Component Value Date    LDLCALC 61.2 (L) 08/14/2020    LDLCALC 71 09/13/2019   ,   Lab Results   Component Value Date    TRIG 119 08/14/2020    TRIG 143 09/13/2019     Lab Results   Component Value Date    CHOL 146 08/14/2020    TRIG 119 08/14/2020    HDL 61 08/14/2020    LDLCALC 61.2 (L) 08/14/2020     Lab Results   Component Value Date    CHOL 146 08/14/2020    TRIG 119 08/14/2020    HDL 61 08/14/2020    LDLCALC 61.2 (L) 08/14/2020     #Multiple TIAs  -Stopped ASA 81 mg daily    #Calcified granuloma  -LLL. Noted on ct chest 1/17/20     #Osetopenia  -DEXA 5/2015   -DEXA 12/6/19: L spine T 0.2. Left fem neck -1.2. 7.2% major. 0.9% hip.   -repeat dexa in 2 years  -Ca/vit D intake, weight bearing, & smoking cessation stressed    #Vitmain D insufficiency- pending labs  -Vit D 23 10/2018 --> 30 9/2019  -vit d increased to 1K daily based on 8/14 labs  -repeat  Lab Results   Component Value Date    VTTRHFRO92UZ 26 (L) 11/02/2020    CIVETGTF68YH 17 (L) 08/14/2020     #Environmental allergies- stable  -resumed singulair + xyzal daily   -declines allergy referral    #Healthcare Maintenance  -Mammo 1/31/20: birads 1. Repeat in 1y.   -Pneumovax 10/2014.   -Shingrix 11/15/18 & 3/27/19  -TDAP 7/31/19  -Influenza 10/28/20, today  -Colonoscopy 5/18/17. Repeat 10y.  -Hep C ab negative 10/12/18  -HIV negative 6/29/20     Has mychart. Follow up with me after surgery.      Kenzie Loza M.D.    Orders Placed This Encounter   Procedures    Influenza - Quadrivalent (PF)    CBC Auto Differential    Comprehensive Metabolic Panel    Hemoglobin A1C     TSH    Magnesium    Vitamin D    Microalbumin/Creatinine Ratio, Urine    Vitamin B12    Methylmalonic Acid, Serum    Ambulatory referral/consult to Optometry      Medications Ordered This Encounter   Medications    dicyclomine (BENTYL) 10 MG capsule     Sig: Take 1 capsule (10 mg total) by mouth 2 (two) times daily.     Dispense:  60 capsule     Refill:  0    guaifenesin-codeine 100-10 mg/5 ml (TUSSI-ORGANIDIN NR)  mg/5 mL syrup     Sig: Take 10 mLs by mouth every 8 (eight) hours as needed for Cough.     Dispense:  118 mL     Refill:  0

## 2020-10-28 NOTE — Clinical Note
Please schedule 3 month follow up. Please fax this note to [facility] for pre-op clearance. Please fax diabetic eye exam referral to [facility] eye clinic to dr. Christi simon.     Call ms. Fior to schedule follow up with me, advise of pre-op sent and to please schedule the dilated eye exam at [facility] (for her diabetes).

## 2020-10-29 DIAGNOSIS — E11.42 TYPE 2 DIABETES MELLITUS WITH DIABETIC POLYNEUROPATHY, WITHOUT LONG-TERM CURRENT USE OF INSULIN: Chronic | ICD-10-CM

## 2020-10-29 DIAGNOSIS — I10 ESSENTIAL HYPERTENSION: ICD-10-CM

## 2020-10-29 DIAGNOSIS — J30.9 CHRONIC ALLERGIC RHINITIS: Chronic | ICD-10-CM

## 2020-10-29 DIAGNOSIS — F17.219 CIGARETTE NICOTINE DEPENDENCE WITH NICOTINE-INDUCED DISORDER: Chronic | ICD-10-CM

## 2020-10-29 DIAGNOSIS — E78.2 MIXED HYPERLIPIDEMIA: Chronic | ICD-10-CM

## 2020-10-29 DIAGNOSIS — J43.2 CENTRILOBULAR EMPHYSEMA: ICD-10-CM

## 2020-10-29 DIAGNOSIS — E66.01 CLASS 2 SEVERE OBESITY DUE TO EXCESS CALORIES WITH SERIOUS COMORBIDITY AND BODY MASS INDEX (BMI) OF 37.0 TO 37.9 IN ADULT: ICD-10-CM

## 2020-10-29 DIAGNOSIS — F33.1 MODERATE EPISODE OF RECURRENT MAJOR DEPRESSIVE DISORDER: ICD-10-CM

## 2020-10-29 NOTE — TELEPHONE ENCOUNTER
----- Message from Janki Camacho sent at 10/29/2020  3:38 PM CDT -----  Regarding: CONCERNING BLOODWORK  Contact: PATIENT  PLEASE CALL PATIENT @ [phone]. THANKS

## 2020-11-02 ENCOUNTER — LAB VISIT (OUTPATIENT)
Dept: LAB | Facility: HOSPITAL | Age: 65
End: 2020-11-02
Attending: INTERNAL MEDICINE
Payer: MEDICARE

## 2020-11-02 DIAGNOSIS — E53.8 B12 DEFICIENCY: ICD-10-CM

## 2020-11-02 DIAGNOSIS — F33.1 MODERATE EPISODE OF RECURRENT MAJOR DEPRESSIVE DISORDER: Chronic | ICD-10-CM

## 2020-11-02 DIAGNOSIS — M43.16 SPONDYLOLISTHESIS OF LUMBAR REGION: Chronic | ICD-10-CM

## 2020-11-02 DIAGNOSIS — I10 ESSENTIAL HYPERTENSION: Chronic | ICD-10-CM

## 2020-11-02 DIAGNOSIS — E11.42 TYPE 2 DIABETES MELLITUS WITH DIABETIC POLYNEUROPATHY, WITHOUT LONG-TERM CURRENT USE OF INSULIN: Chronic | ICD-10-CM

## 2020-11-02 DIAGNOSIS — J43.9 PULMONARY EMPHYSEMA, UNSPECIFIED EMPHYSEMA TYPE: Chronic | ICD-10-CM

## 2020-11-02 DIAGNOSIS — E55.9 VITAMIN D INSUFFICIENCY: ICD-10-CM

## 2020-11-02 LAB
25(OH)D3+25(OH)D2 SERPL-MCNC: 26 NG/ML (ref 30–96)
ALBUMIN SERPL BCP-MCNC: 4.1 G/DL (ref 3.5–5.2)
ALP SERPL-CCNC: 90 U/L (ref 55–135)
ALT SERPL W/O P-5'-P-CCNC: 26 U/L (ref 10–44)
ANION GAP SERPL CALC-SCNC: 15 MMOL/L (ref 8–16)
AST SERPL-CCNC: 25 U/L (ref 10–40)
BASOPHILS # BLD AUTO: 0.05 K/UL (ref 0–0.2)
BASOPHILS NFR BLD: 0.7 % (ref 0–1.9)
BILIRUB SERPL-MCNC: 0.2 MG/DL (ref 0.1–1)
BUN SERPL-MCNC: 13 MG/DL (ref 8–23)
CALCIUM SERPL-MCNC: 10 MG/DL (ref 8.7–10.5)
CHLORIDE SERPL-SCNC: 98 MMOL/L (ref 95–110)
CO2 SERPL-SCNC: 27 MMOL/L (ref 23–29)
CREAT SERPL-MCNC: 0.8 MG/DL (ref 0.5–1.4)
DIFFERENTIAL METHOD: ABNORMAL
EOSINOPHIL # BLD AUTO: 0.2 K/UL (ref 0–0.5)
EOSINOPHIL NFR BLD: 3.1 % (ref 0–8)
ERYTHROCYTE [DISTWIDTH] IN BLOOD BY AUTOMATED COUNT: 13.7 % (ref 11.5–14.5)
EST. GFR  (AFRICAN AMERICAN): >60 ML/MIN/1.73 M^2
EST. GFR  (NON AFRICAN AMERICAN): >60 ML/MIN/1.73 M^2
GLUCOSE SERPL-MCNC: 145 MG/DL (ref 70–110)
HCT VFR BLD AUTO: 43.5 % (ref 37–48.5)
HGB BLD-MCNC: 13.7 G/DL (ref 12–16)
IMM GRANULOCYTES # BLD AUTO: 0.01 K/UL (ref 0–0.04)
IMM GRANULOCYTES NFR BLD AUTO: 0.1 % (ref 0–0.5)
LYMPHOCYTES # BLD AUTO: 2.5 K/UL (ref 1–4.8)
LYMPHOCYTES NFR BLD: 37.4 % (ref 18–48)
MAGNESIUM SERPL-MCNC: 1.5 MG/DL (ref 1.6–2.6)
MCH RBC QN AUTO: 29.3 PG (ref 27–31)
MCHC RBC AUTO-ENTMCNC: 31.5 G/DL (ref 32–36)
MCV RBC AUTO: 93 FL (ref 82–98)
MONOCYTES # BLD AUTO: 0.4 K/UL (ref 0.3–1)
MONOCYTES NFR BLD: 6.4 % (ref 4–15)
NEUTROPHILS # BLD AUTO: 3.5 K/UL (ref 1.8–7.7)
NEUTROPHILS NFR BLD: 52.3 % (ref 38–73)
NRBC BLD-RTO: 0 /100 WBC
PLATELET # BLD AUTO: 227 K/UL (ref 150–350)
PLATELET BLD QL SMEAR: ABNORMAL
PMV BLD AUTO: 11.2 FL (ref 9.2–12.9)
POTASSIUM SERPL-SCNC: 4.5 MMOL/L (ref 3.5–5.1)
PROT SERPL-MCNC: 7.5 G/DL (ref 6–8.4)
RBC # BLD AUTO: 4.68 M/UL (ref 4–5.4)
SODIUM SERPL-SCNC: 140 MMOL/L (ref 136–145)
TSH SERPL DL<=0.005 MIU/L-ACNC: 3.54 UIU/ML (ref 0.4–4)
VIT B12 SERPL-MCNC: 1120 PG/ML (ref 210–950)
WBC # BLD AUTO: 6.76 K/UL (ref 3.9–12.7)

## 2020-11-02 PROCEDURE — 80053 COMPREHEN METABOLIC PANEL: CPT

## 2020-11-02 PROCEDURE — 83735 ASSAY OF MAGNESIUM: CPT

## 2020-11-02 PROCEDURE — 82607 VITAMIN B-12: CPT

## 2020-11-02 PROCEDURE — 84443 ASSAY THYROID STIM HORMONE: CPT

## 2020-11-02 PROCEDURE — 85025 COMPLETE CBC W/AUTO DIFF WBC: CPT

## 2020-11-02 PROCEDURE — 83036 HEMOGLOBIN GLYCOSYLATED A1C: CPT

## 2020-11-02 PROCEDURE — 82306 VITAMIN D 25 HYDROXY: CPT

## 2020-11-02 PROCEDURE — 36415 COLL VENOUS BLD VENIPUNCTURE: CPT | Mod: PO

## 2020-11-02 PROCEDURE — 83921 ORGANIC ACID SINGLE QUANT: CPT

## 2020-11-03 PROBLEM — E78.5 HYPERLIPIDEMIA ASSOCIATED WITH TYPE 2 DIABETES MELLITUS: Chronic | Status: ACTIVE | Noted: 2019-11-21

## 2020-11-03 PROBLEM — K58.2 IRRITABLE BOWEL SYNDROME WITH BOTH CONSTIPATION AND DIARRHEA: Status: ACTIVE | Noted: 2020-11-03

## 2020-11-03 PROBLEM — K21.9 GASTROESOPHAGEAL REFLUX DISEASE: Status: ACTIVE | Noted: 2020-11-03

## 2020-11-03 PROBLEM — F33.41 RECURRENT MAJOR DEPRESSIVE DISORDER, IN PARTIAL REMISSION: Chronic | Status: ACTIVE | Noted: 2019-11-21

## 2020-11-03 PROBLEM — I15.2 HYPERTENSION ASSOCIATED WITH TYPE 2 DIABETES MELLITUS: Status: ACTIVE | Noted: 2019-11-21

## 2020-11-03 PROBLEM — E11.59 HYPERTENSION ASSOCIATED WITH TYPE 2 DIABETES MELLITUS: Chronic | Status: ACTIVE | Noted: 2019-11-21

## 2020-11-03 PROBLEM — F33.41 RECURRENT MAJOR DEPRESSIVE DISORDER, IN PARTIAL REMISSION: Status: ACTIVE | Noted: 2019-11-21

## 2020-11-03 PROBLEM — I15.2 HYPERTENSION ASSOCIATED WITH TYPE 2 DIABETES MELLITUS: Chronic | Status: ACTIVE | Noted: 2019-11-21

## 2020-11-03 PROBLEM — E78.2 MIXED HYPERLIPIDEMIA: Status: ACTIVE | Noted: 2019-11-21

## 2020-11-03 PROBLEM — M48.062 SPINAL STENOSIS OF LUMBAR REGION WITH NEUROGENIC CLAUDICATION: Status: ACTIVE | Noted: 2020-11-03

## 2020-11-03 PROBLEM — K58.2 IRRITABLE BOWEL SYNDROME WITH BOTH CONSTIPATION AND DIARRHEA: Chronic | Status: ACTIVE | Noted: 2020-11-03

## 2020-11-03 PROBLEM — E11.59 HYPERTENSION ASSOCIATED WITH TYPE 2 DIABETES MELLITUS: Status: ACTIVE | Noted: 2019-11-21

## 2020-11-03 PROBLEM — E11.69 HYPERLIPIDEMIA ASSOCIATED WITH TYPE 2 DIABETES MELLITUS: Chronic | Status: ACTIVE | Noted: 2019-11-21

## 2020-11-03 PROBLEM — J84.10 CALCIFIED GRANULOMA OF LUNG: Status: ACTIVE | Noted: 2020-11-03

## 2020-11-03 LAB
ESTIMATED AVG GLUCOSE: 137 MG/DL (ref 68–131)
HBA1C MFR BLD HPLC: 6.4 % (ref 4–5.6)

## 2020-11-03 RX ORDER — DESVENLAFAXINE 100 MG/1
100 TABLET, EXTENDED RELEASE ORAL DAILY
Qty: 90 TABLET | Refills: 1 | OUTPATIENT
Start: 2020-11-03

## 2020-11-03 RX ORDER — LISINOPRIL 20 MG/1
20 TABLET ORAL DAILY
Qty: 90 TABLET | Refills: 1 | OUTPATIENT
Start: 2020-11-03

## 2020-11-03 RX ORDER — DICYCLOMINE HYDROCHLORIDE 10 MG/1
10 CAPSULE ORAL 2 TIMES DAILY
Qty: 60 CAPSULE | Refills: 0 | OUTPATIENT
Start: 2020-11-03 | End: 2020-12-03

## 2020-11-03 RX ORDER — HYDROXYZINE HYDROCHLORIDE 25 MG/1
25 TABLET, FILM COATED ORAL 2 TIMES DAILY
Qty: 180 TABLET | Refills: 1 | OUTPATIENT
Start: 2020-11-03

## 2020-11-03 RX ORDER — PRAVASTATIN SODIUM 40 MG/1
40 TABLET ORAL DAILY
Qty: 90 TABLET | Refills: 1 | OUTPATIENT
Start: 2020-11-03

## 2020-11-03 RX ORDER — HYDROCHLOROTHIAZIDE 25 MG/1
25 TABLET ORAL DAILY
Qty: 90 TABLET | Refills: 1 | OUTPATIENT
Start: 2020-11-03 | End: 2021-11-03

## 2020-11-04 ENCOUNTER — TELEPHONE (OUTPATIENT)
Dept: FAMILY MEDICINE | Facility: CLINIC | Age: 65
End: 2020-11-04

## 2020-11-04 NOTE — TELEPHONE ENCOUNTER
----- Message from Kenzie Loza MD sent at 11/3/2020  4:49 PM CST -----  Please schedule 3 month follow up. Please fax this note to [facility] for pre-op clearance. Please fax diabetic eye exam referral to [facility] eye clinic to dr. Christi simon. Call ms. Childress to schedule follow up with me, advise of pre-op sent and to please schedule the dilated eye exam at [facility] (for her diabetes).

## 2020-11-05 ENCOUNTER — TELEPHONE (OUTPATIENT)
Dept: FAMILY MEDICINE | Facility: CLINIC | Age: 65
End: 2020-11-05

## 2020-11-05 NOTE — TELEPHONE ENCOUNTER
Pt states Dr. Perez office has not received pre-op clearance. Pt is also informed to continue to stay off NSAIDs per Dr. Loza previous notes.

## 2020-11-05 NOTE — TELEPHONE ENCOUNTER
----- Message from Glenn Brito sent at 11/5/2020  9:20 AM CST -----  Contact: self  Would like to consult with nurse regarding medication.  Please contact [facility] [facility] @ [phone].  Thanks/As

## 2020-11-05 NOTE — TELEPHONE ENCOUNTER
Pt asking if there is any reason she can not restart her celebrex. It was stopped at her ov with Dr. Loza on 8/14/20. She is no longer taking mobic    #Generalized OA  -has been taking celebrex 200 mg daily & mobic --> advised to stop mobic and celebrex     #HTN- elevated  -Lisinopril/HCTZ 20/12.5 mg daily. Stopped Lasix 40 mg daily at 10/8/18 visit (on HCTZ). Echo within normal limits in March 2017. Stopped Verapamil 240 mg daily.  -12/21/19: reduce lisinopril to 10/12.5 mg daily.  /65. She did not get the med and continued prior dose.  -1/31. /63. STOPPED lisinopril/hctz 20/12.5 mg daily and START lisinopril 20 mg daily. Aim for goal 120/80  -has been taking mobic, excedrin and celebrex. Advised to stop mobic.  -restart hctz 25 mg daily. Continue lisinopril 40 mg

## 2020-11-05 NOTE — TELEPHONE ENCOUNTER
It appears that she wanted her to stop all anti-inflammatory medications prior to her surgery. I am not sure if the surgery has been completed yet or not.

## 2020-11-05 NOTE — TELEPHONE ENCOUNTER
----- Message from Beronica Holcomb sent at 11/5/2020  4:49 PM CST -----  Regarding: pt  Would like a call from nurse in reference to fax for a orthopedic provider . Please call back at .[phone] (home)       Thank You ,   Beronica Holcomb

## 2020-11-06 ENCOUNTER — TELEPHONE (OUTPATIENT)
Dept: FAMILY MEDICINE | Facility: CLINIC | Age: 65
End: 2020-11-06

## 2020-11-06 LAB — METHYLMALONATE SERPL-SCNC: 0.19 UMOL/L

## 2020-11-06 NOTE — PROGRESS NOTES
The methylmalonic acid level is currently normal.  No changes are recommended.    The A1c is currently controlled.  Follow-up with Dr. Loza in six months on this to consider a repeat.    The thyroid function is currently normal.    The B12 level is too high.  Change the B12 dose from one pill a day to one  pill a week and follow-up with Dr. Loza in three months to consider rechecking this.    The vitamin-D level is minimally low.  I do not recommend any change in medications based on this.    All of the electrolytes appear stable at this time.  No changes are recommended.    The magnesium level is low.  Please start an over-the-counter dose of magnesium daily.    The CBC shows no sign of infection or anemia at this time.

## 2020-11-06 NOTE — TELEPHONE ENCOUNTER
----- Message from Maryjane Singh sent at 11/6/2020  9:47 AM CST -----  Regarding: faxing papers  Contact: pt  Please call pt regarding faxing some papers. Pt can be reached at [phone]

## 2020-11-20 DIAGNOSIS — I10 ESSENTIAL HYPERTENSION: ICD-10-CM

## 2020-11-20 DIAGNOSIS — F33.1 MODERATE EPISODE OF RECURRENT MAJOR DEPRESSIVE DISORDER: ICD-10-CM

## 2020-11-20 DIAGNOSIS — E11.42 TYPE 2 DIABETES MELLITUS WITH DIABETIC POLYNEUROPATHY, WITHOUT LONG-TERM CURRENT USE OF INSULIN: ICD-10-CM

## 2020-11-20 DIAGNOSIS — J43.2 CENTRILOBULAR EMPHYSEMA: ICD-10-CM

## 2020-11-20 DIAGNOSIS — E66.01 CLASS 2 SEVERE OBESITY DUE TO EXCESS CALORIES WITH SERIOUS COMORBIDITY AND BODY MASS INDEX (BMI) OF 37.0 TO 37.9 IN ADULT: ICD-10-CM

## 2020-11-20 DIAGNOSIS — J30.9 CHRONIC ALLERGIC RHINITIS: Chronic | ICD-10-CM

## 2020-11-20 DIAGNOSIS — F17.219 CIGARETTE NICOTINE DEPENDENCE WITH NICOTINE-INDUCED DISORDER: Chronic | ICD-10-CM

## 2020-11-20 DIAGNOSIS — E78.2 MIXED HYPERLIPIDEMIA: Chronic | ICD-10-CM

## 2020-11-20 RX ORDER — MONTELUKAST SODIUM 10 MG/1
10 TABLET ORAL NIGHTLY
Qty: 90 TABLET | Refills: 0 | Status: SHIPPED | OUTPATIENT
Start: 2020-11-20 | End: 2021-02-15

## 2020-11-20 RX ORDER — DICYCLOMINE HYDROCHLORIDE 10 MG/1
10 CAPSULE ORAL 2 TIMES DAILY
Qty: 60 CAPSULE | Refills: 0 | Status: SHIPPED | OUTPATIENT
Start: 2020-11-20 | End: 2020-12-20

## 2020-11-20 NOTE — TELEPHONE ENCOUNTER
----- Message from Rita Hagan sent at 11/20/2020 10:26 AM CST -----  Regarding: Medication Refill  Contact: Fior  Type: RX Refill Request    Who Called: Fior    Have you contacted your pharmacy:No    Refill or New Rx:Refill    RX Name and Strength:montelukast (SINGULAIR) 10 mg tablet 90 tablet   dicyclomine (BENTYL) 10 MG capsule 60 capsule     Preferred Pharmacy with phone number:[place] [address] [phone] (Phone)  [phone] (Fax)    Local or Mail Order:Local    Ordering Provider:Dago    Would the patient rather a call back or a response via My [facility]? Call    Best Call Back Number:[phone]    Additional Information: Medication Refill

## 2020-12-04 DIAGNOSIS — I10 ESSENTIAL HYPERTENSION: Chronic | ICD-10-CM

## 2020-12-04 RX ORDER — PREGABALIN 100 MG/1
100 CAPSULE ORAL 2 TIMES DAILY
Qty: 180 CAPSULE | Refills: 0 | Status: CANCELLED | OUTPATIENT
Start: 2020-12-04

## 2020-12-04 NOTE — TELEPHONE ENCOUNTER
----- Message from Janki Camacho sent at 12/4/2020  1:57 PM CST -----  Regarding: refill  Contact: patient  Type:  RX Refill Request    Who Called: patient  Refill or New Rx:refill  RX Name and Strength:Lyrica 100 mg & HCTZ 25 mg  How is the patient currently taking it? (ex. 1XDay):1 pill @ 2 x day & 1 pill day  Is this a 30 day or 90 day RX:30 & 90  Preferred Pharmacy with phone number:  (lyrica send to:)  [facility], [place], [place] - [address] [facility]  [address] [address] [address]  Phone: [phone] Fax: [phone]  (HCTZ send to:)  [facility] - [address]  [address]  [facility] [address]  Phone: [phone] Fax: [phone]    Local or Mail Order:mail   Ordering Provider:Dago  Would the patient rather a call back or a response via [facility]? call  Best Call Back Number:[phone]  Additional Information: please call patient when sent

## 2020-12-07 NOTE — TELEPHONE ENCOUNTER
----- Message from Araceli Jackson sent at 12/7/2020 11:35 AM CST -----  Contact: Fior Castro is calling in regards to prescription request for Lyrica. Please call her back at [phone].        [facility], [place], [place] - [address] [facility] [address] [address] [address] [address]  Phone: [phone] Fax: [phone]      Thanks  DD

## 2020-12-08 RX ORDER — HYDROCHLOROTHIAZIDE 25 MG/1
25 TABLET ORAL DAILY
Qty: 90 TABLET | Refills: 0 | Status: SHIPPED | OUTPATIENT
Start: 2020-12-08 | End: 2021-12-08

## 2020-12-08 RX ORDER — PREGABALIN 100 MG/1
100 CAPSULE ORAL 2 TIMES DAILY
Qty: 180 CAPSULE | Refills: 0 | Status: SHIPPED | OUTPATIENT
Start: 2020-12-08

## 2020-12-08 NOTE — TELEPHONE ENCOUNTER
The patient requested medicine refills and I did refill it once.    Health Maintenance Due   Topic Date Due    Sign Pain Contract  02/13/1974    Complete Opioid Risk Tool  02/13/1974    Eye Exam  08/08/2020     BP Readings from Last 3 Encounters:   10/28/20 138/74   08/24/20 138/80   08/20/20 (!) 170/92

## 2020-12-11 ENCOUNTER — PATIENT MESSAGE (OUTPATIENT)
Dept: OTHER | Facility: OTHER | Age: 65
End: 2020-12-11

## 2020-12-19 DIAGNOSIS — M15.9 PRIMARY OSTEOARTHRITIS INVOLVING MULTIPLE JOINTS: ICD-10-CM

## 2020-12-19 DIAGNOSIS — M19.011 LOCALIZED OSTEOARTHRITIS OF RIGHT SHOULDER: ICD-10-CM

## 2020-12-19 DIAGNOSIS — F33.1 MODERATE EPISODE OF RECURRENT MAJOR DEPRESSIVE DISORDER: ICD-10-CM

## 2020-12-19 DIAGNOSIS — J30.9 CHRONIC ALLERGIC RHINITIS: Chronic | ICD-10-CM

## 2020-12-19 DIAGNOSIS — M17.11 PRIMARY OSTEOARTHRITIS OF RIGHT KNEE: Chronic | ICD-10-CM

## 2020-12-19 DIAGNOSIS — E78.2 MIXED HYPERLIPIDEMIA: Chronic | ICD-10-CM

## 2020-12-19 DIAGNOSIS — E11.42 TYPE 2 DIABETES MELLITUS WITH DIABETIC POLYNEUROPATHY, WITHOUT LONG-TERM CURRENT USE OF INSULIN: Chronic | ICD-10-CM

## 2020-12-19 DIAGNOSIS — F17.219 CIGARETTE NICOTINE DEPENDENCE WITH NICOTINE-INDUCED DISORDER: Chronic | ICD-10-CM

## 2020-12-19 DIAGNOSIS — E66.01 CLASS 2 SEVERE OBESITY DUE TO EXCESS CALORIES WITH SERIOUS COMORBIDITY AND BODY MASS INDEX (BMI) OF 37.0 TO 37.9 IN ADULT: ICD-10-CM

## 2020-12-19 DIAGNOSIS — J43.2 CENTRILOBULAR EMPHYSEMA: ICD-10-CM

## 2020-12-19 DIAGNOSIS — I10 ESSENTIAL HYPERTENSION: ICD-10-CM

## 2020-12-20 RX ORDER — LISINOPRIL 20 MG/1
TABLET ORAL
Qty: 90 TABLET | Refills: 0 | Status: SHIPPED | OUTPATIENT
Start: 2020-12-20 | End: 2021-03-10

## 2020-12-20 RX ORDER — DESVENLAFAXINE 100 MG/1
TABLET, EXTENDED RELEASE ORAL
Qty: 90 TABLET | Refills: 0 | Status: SHIPPED | OUTPATIENT
Start: 2020-12-20 | End: 2021-03-10

## 2020-12-20 RX ORDER — CELECOXIB 200 MG/1
CAPSULE ORAL
Qty: 90 CAPSULE | Refills: 0 | Status: SHIPPED | OUTPATIENT
Start: 2020-12-20 | End: 2021-03-10

## 2020-12-20 RX ORDER — SITAGLIPTIN 50 MG/1
TABLET, FILM COATED ORAL
Qty: 90 TABLET | Refills: 0 | Status: SHIPPED | OUTPATIENT
Start: 2020-12-20 | End: 2021-03-10

## 2020-12-20 RX ORDER — PRAVASTATIN SODIUM 40 MG/1
TABLET ORAL
Qty: 90 TABLET | Refills: 0 | Status: SHIPPED | OUTPATIENT
Start: 2020-12-20 | End: 2021-03-10

## 2020-12-28 DIAGNOSIS — E78.2 MIXED HYPERLIPIDEMIA: Chronic | ICD-10-CM

## 2020-12-28 DIAGNOSIS — F17.219 CIGARETTE NICOTINE DEPENDENCE WITH NICOTINE-INDUCED DISORDER: Chronic | ICD-10-CM

## 2020-12-28 DIAGNOSIS — I10 ESSENTIAL HYPERTENSION: ICD-10-CM

## 2020-12-28 DIAGNOSIS — F33.1 MODERATE EPISODE OF RECURRENT MAJOR DEPRESSIVE DISORDER: ICD-10-CM

## 2020-12-28 DIAGNOSIS — E11.42 TYPE 2 DIABETES MELLITUS WITH DIABETIC POLYNEUROPATHY, WITHOUT LONG-TERM CURRENT USE OF INSULIN: ICD-10-CM

## 2020-12-28 DIAGNOSIS — J43.2 CENTRILOBULAR EMPHYSEMA: ICD-10-CM

## 2020-12-28 DIAGNOSIS — E66.01 CLASS 2 SEVERE OBESITY DUE TO EXCESS CALORIES WITH SERIOUS COMORBIDITY AND BODY MASS INDEX (BMI) OF 37.0 TO 37.9 IN ADULT: ICD-10-CM

## 2020-12-28 DIAGNOSIS — J30.9 CHRONIC ALLERGIC RHINITIS: Chronic | ICD-10-CM

## 2020-12-28 RX ORDER — DICYCLOMINE HYDROCHLORIDE 10 MG/1
10 CAPSULE ORAL 2 TIMES DAILY
Qty: 180 CAPSULE | Refills: 0 | Status: SHIPPED | OUTPATIENT
Start: 2020-12-28

## 2020-12-28 RX ORDER — LEVOCETIRIZINE DIHYDROCHLORIDE 5 MG/1
5 TABLET, FILM COATED ORAL NIGHTLY
Qty: 90 TABLET | Refills: 0 | Status: SHIPPED | OUTPATIENT
Start: 2020-12-28 | End: 2021-12-28

## 2020-12-28 RX ORDER — DICYCLOMINE HYDROCHLORIDE 10 MG/1
10 CAPSULE ORAL 2 TIMES DAILY
COMMUNITY
End: 2020-12-28 | Stop reason: SDUPTHER

## 2020-12-28 NOTE — TELEPHONE ENCOUNTER
----- Message from Alanis Carroll sent at 12/28/2020 10:57 AM CST -----  Contact: Pt  .Type:  Needs Medical Advice    Who Called:  Fior  Symptoms (please be specific):  head cold  How long has patient had these symptoms:   3 days  Pharmacy name and phone #:   .  [facility] [place], [place] - [address] [facility] [address]  [address] [address] [address]  Phone: [phone] Fax: [phone]        Would the patient rather a call back or a response via [facility]?  callback  Best Call Back Number:  [phone] (home)     Additional Information:     .Type:  RX Refill Request    Who Called:   Refill or New Rx: refill  RX Name and Strength: levocetirizine (XYZAL) 5 MG tablet  How is the patient currently taking it? (ex. 1XDay):  Is this a 30 day or 90 day RX:   Preferred Pharmacy with phone number: .  [facility] [place], [place] - [address] [facility] [address]  [address] [address] [address]  Phone: [phone] Fax: [phone]          Local or Mail Order: local  Ordering Provider: Dago  Would the patient rather a call back or a response via [facility]?  Call back  Best Call Back Number: .[phone] (home)     Additional Information:  pt requested second RX not seen in chart

## 2020-12-28 NOTE — TELEPHONE ENCOUNTER
Pt requesting refill on Bentyl and Xyzal. She also states that she washed her hair and slept with a fan on and now has a head cold. Nasal congestion and sore throat. She states this is not Covid, it is due to her wet head. She would like medication to treat symptoms. Please advise.

## 2020-12-28 NOTE — TELEPHONE ENCOUNTER
She probably needs to be tested for Covid.  I would recommend using Tylenol in addition to the Xyzal refill as above

## 2021-01-05 ENCOUNTER — OFFICE VISIT (OUTPATIENT)
Dept: FAMILY MEDICINE | Facility: CLINIC | Age: 66
End: 2021-01-05
Payer: MEDICARE

## 2021-01-05 VITALS
HEART RATE: 82 BPM | TEMPERATURE: 97 F | WEIGHT: 210 LBS | DIASTOLIC BLOOD PRESSURE: 73 MMHG | OXYGEN SATURATION: 97 % | HEIGHT: 60 IN | BODY MASS INDEX: 41.23 KG/M2 | SYSTOLIC BLOOD PRESSURE: 159 MMHG

## 2021-01-05 DIAGNOSIS — J30.1 SEASONAL ALLERGIC RHINITIS DUE TO POLLEN: Primary | ICD-10-CM

## 2021-01-05 DIAGNOSIS — J30.9 CHRONIC ALLERGIC RHINITIS: Chronic | ICD-10-CM

## 2021-01-05 DIAGNOSIS — F17.219 CIGARETTE NICOTINE DEPENDENCE WITH NICOTINE-INDUCED DISORDER: Chronic | ICD-10-CM

## 2021-01-05 DIAGNOSIS — I15.2 HYPERTENSION ASSOCIATED WITH DIABETES: ICD-10-CM

## 2021-01-05 DIAGNOSIS — F33.1 MODERATE EPISODE OF RECURRENT MAJOR DEPRESSIVE DISORDER: ICD-10-CM

## 2021-01-05 DIAGNOSIS — E11.59 HYPERTENSION ASSOCIATED WITH DIABETES: ICD-10-CM

## 2021-01-05 PROCEDURE — 1126F PR PAIN SEVERITY QUANTIFIED, NO PAIN PRESENT: ICD-10-PCS | Mod: S$GLB,,, | Performed by: INTERNAL MEDICINE

## 2021-01-05 PROCEDURE — 99999 PR PBB SHADOW E&M-EST. PATIENT-LVL III: CPT | Mod: PBBFAC,,, | Performed by: INTERNAL MEDICINE

## 2021-01-05 PROCEDURE — 3008F BODY MASS INDEX DOCD: CPT | Mod: CPTII,S$GLB,, | Performed by: INTERNAL MEDICINE

## 2021-01-05 PROCEDURE — 96372 PR INJECTION,THERAP/PROPH/DIAG2ST, IM OR SUBCUT: ICD-10-PCS | Mod: S$GLB,,, | Performed by: INTERNAL MEDICINE

## 2021-01-05 PROCEDURE — 96372 THER/PROPH/DIAG INJ SC/IM: CPT | Mod: S$GLB,,, | Performed by: INTERNAL MEDICINE

## 2021-01-05 PROCEDURE — 3008F PR BODY MASS INDEX (BMI) DOCUMENTED: ICD-10-PCS | Mod: CPTII,S$GLB,, | Performed by: INTERNAL MEDICINE

## 2021-01-05 PROCEDURE — 1126F AMNT PAIN NOTED NONE PRSNT: CPT | Mod: S$GLB,,, | Performed by: INTERNAL MEDICINE

## 2021-01-05 PROCEDURE — 99215 OFFICE O/P EST HI 40 MIN: CPT | Mod: 25,S$GLB,, | Performed by: INTERNAL MEDICINE

## 2021-01-05 PROCEDURE — 99215 PR OFFICE/OUTPT VISIT, EST, LEVL V, 40-54 MIN: ICD-10-PCS | Mod: 25,S$GLB,, | Performed by: INTERNAL MEDICINE

## 2021-01-05 PROCEDURE — 99999 PR PBB SHADOW E&M-EST. PATIENT-LVL III: ICD-10-PCS | Mod: PBBFAC,,, | Performed by: INTERNAL MEDICINE

## 2021-01-05 RX ORDER — ONDANSETRON HYDROCHLORIDE 8 MG/1
8 TABLET, FILM COATED ORAL EVERY 8 HOURS PRN
Qty: 30 TABLET | Refills: 3 | Status: SHIPPED | OUTPATIENT
Start: 2021-01-05

## 2021-01-05 RX ORDER — AZELASTINE 1 MG/ML
2 SPRAY, METERED NASAL 2 TIMES DAILY
Qty: 30 ML | Refills: 2 | Status: SHIPPED | OUTPATIENT
Start: 2021-01-05

## 2021-01-05 RX ORDER — METHOCARBAMOL 750 MG/1
750 TABLET, FILM COATED ORAL
COMMUNITY
Start: 2020-12-16 | End: 2021-01-05 | Stop reason: ALTCHOICE

## 2021-01-05 RX ORDER — OXYCODONE AND ACETAMINOPHEN 10; 325 MG/1; MG/1
1 TABLET ORAL EVERY 4 HOURS PRN
COMMUNITY
Start: 2020-12-23

## 2021-01-05 RX ORDER — BETAMETHASONE SODIUM PHOSPHATE AND BETAMETHASONE ACETATE 3; 3 MG/ML; MG/ML
6 INJECTION, SUSPENSION INTRA-ARTICULAR; INTRALESIONAL; INTRAMUSCULAR; SOFT TISSUE
Status: COMPLETED | OUTPATIENT
Start: 2021-01-05 | End: 2021-01-05

## 2021-01-05 RX ORDER — METHYLPREDNISOLONE 4 MG/1
TABLET ORAL
Qty: 21 TABLET | Refills: 0 | Status: SHIPPED | OUTPATIENT
Start: 2021-01-06 | End: 2021-02-15

## 2021-01-05 RX ORDER — FLUTICASONE PROPIONATE 50 MCG
SPRAY, SUSPENSION (ML) NASAL
Qty: 48 G | Refills: 1 | Status: SHIPPED | OUTPATIENT
Start: 2021-01-05

## 2021-01-05 RX ORDER — ARIPIPRAZOLE 5 MG/1
5 TABLET ORAL DAILY
Qty: 30 TABLET | Refills: 0 | Status: SHIPPED | OUTPATIENT
Start: 2021-01-05 | End: 2021-02-03

## 2021-01-05 RX ADMIN — BETAMETHASONE SODIUM PHOSPHATE AND BETAMETHASONE ACETATE 6 MG: 3; 3 INJECTION, SUSPENSION INTRA-ARTICULAR; INTRALESIONAL; INTRAMUSCULAR; SOFT TISSUE at 11:01

## 2021-01-08 ENCOUNTER — PATIENT MESSAGE (OUTPATIENT)
Dept: FAMILY MEDICINE | Facility: CLINIC | Age: 66
End: 2021-01-08

## 2021-01-11 ENCOUNTER — TELEPHONE (OUTPATIENT)
Dept: FAMILY MEDICINE | Facility: CLINIC | Age: 66
End: 2021-01-11

## 2021-01-22 DIAGNOSIS — F33.1 MODERATE EPISODE OF RECURRENT MAJOR DEPRESSIVE DISORDER: ICD-10-CM

## 2021-01-22 RX ORDER — ARIPIPRAZOLE 5 MG/1
5 TABLET ORAL DAILY
Qty: 30 TABLET | Refills: 3 | Status: CANCELLED | OUTPATIENT
Start: 2021-01-22

## 2021-01-25 DIAGNOSIS — F33.1 MODERATE EPISODE OF RECURRENT MAJOR DEPRESSIVE DISORDER: ICD-10-CM

## 2021-01-25 RX ORDER — METHOCARBAMOL 750 MG/1
1 TABLET, FILM COATED ORAL 2 TIMES DAILY
COMMUNITY
Start: 2021-01-22

## 2021-01-25 RX ORDER — ARIPIPRAZOLE 5 MG/1
5 TABLET ORAL DAILY
Qty: 30 TABLET | Refills: 5 | Status: CANCELLED | OUTPATIENT
Start: 2021-01-25

## 2021-01-27 DIAGNOSIS — F33.1 MODERATE EPISODE OF RECURRENT MAJOR DEPRESSIVE DISORDER: ICD-10-CM

## 2021-01-29 RX ORDER — ARIPIPRAZOLE 5 MG/1
5 TABLET ORAL DAILY
Qty: 30 TABLET | Refills: 0 | OUTPATIENT
Start: 2021-01-29

## 2021-02-03 DIAGNOSIS — F33.1 MODERATE EPISODE OF RECURRENT MAJOR DEPRESSIVE DISORDER: ICD-10-CM

## 2021-02-03 RX ORDER — ARIPIPRAZOLE 5 MG/1
10 TABLET ORAL DAILY
Qty: 30 TABLET | Refills: 0 | Status: CANCELLED | OUTPATIENT
Start: 2021-02-03

## 2021-02-03 RX ORDER — ARIPIPRAZOLE 10 MG/1
10 TABLET ORAL DAILY
Qty: 30 TABLET | Refills: 0 | Status: SHIPPED | OUTPATIENT
Start: 2021-02-03 | End: 2021-04-14

## 2021-02-08 ENCOUNTER — TELEPHONE (OUTPATIENT)
Dept: FAMILY MEDICINE | Facility: CLINIC | Age: 66
End: 2021-02-08

## 2021-02-13 DIAGNOSIS — F33.1 MODERATE EPISODE OF RECURRENT MAJOR DEPRESSIVE DISORDER: ICD-10-CM

## 2021-02-13 DIAGNOSIS — F17.219 CIGARETTE NICOTINE DEPENDENCE WITH NICOTINE-INDUCED DISORDER: Chronic | ICD-10-CM

## 2021-02-13 DIAGNOSIS — J30.9 CHRONIC ALLERGIC RHINITIS: Chronic | ICD-10-CM

## 2021-02-13 DIAGNOSIS — I10 ESSENTIAL HYPERTENSION: ICD-10-CM

## 2021-02-13 DIAGNOSIS — J43.2 CENTRILOBULAR EMPHYSEMA: ICD-10-CM

## 2021-02-13 DIAGNOSIS — E78.2 MIXED HYPERLIPIDEMIA: Chronic | ICD-10-CM

## 2021-02-13 DIAGNOSIS — E66.01 CLASS 2 SEVERE OBESITY DUE TO EXCESS CALORIES WITH SERIOUS COMORBIDITY AND BODY MASS INDEX (BMI) OF 37.0 TO 37.9 IN ADULT: ICD-10-CM

## 2021-02-13 DIAGNOSIS — E11.42 TYPE 2 DIABETES MELLITUS WITH DIABETIC POLYNEUROPATHY, WITHOUT LONG-TERM CURRENT USE OF INSULIN: ICD-10-CM

## 2021-02-15 RX ORDER — MONTELUKAST SODIUM 10 MG/1
TABLET ORAL
Qty: 90 TABLET | Refills: 1 | Status: SHIPPED | OUTPATIENT
Start: 2021-02-15

## 2021-03-10 DIAGNOSIS — M17.11 PRIMARY OSTEOARTHRITIS OF RIGHT KNEE: Chronic | ICD-10-CM

## 2021-03-10 DIAGNOSIS — F33.1 MODERATE EPISODE OF RECURRENT MAJOR DEPRESSIVE DISORDER: ICD-10-CM

## 2021-03-10 DIAGNOSIS — M15.9 PRIMARY OSTEOARTHRITIS INVOLVING MULTIPLE JOINTS: ICD-10-CM

## 2021-03-10 DIAGNOSIS — J43.2 CENTRILOBULAR EMPHYSEMA: ICD-10-CM

## 2021-03-10 DIAGNOSIS — J30.9 CHRONIC ALLERGIC RHINITIS: Chronic | ICD-10-CM

## 2021-03-10 DIAGNOSIS — E78.2 MIXED HYPERLIPIDEMIA: Chronic | ICD-10-CM

## 2021-03-10 DIAGNOSIS — F17.219 CIGARETTE NICOTINE DEPENDENCE WITH NICOTINE-INDUCED DISORDER: Chronic | ICD-10-CM

## 2021-03-10 DIAGNOSIS — I10 ESSENTIAL HYPERTENSION: ICD-10-CM

## 2021-03-10 DIAGNOSIS — M19.011 LOCALIZED OSTEOARTHRITIS OF RIGHT SHOULDER: ICD-10-CM

## 2021-03-10 DIAGNOSIS — E11.42 TYPE 2 DIABETES MELLITUS WITH DIABETIC POLYNEUROPATHY, WITHOUT LONG-TERM CURRENT USE OF INSULIN: Chronic | ICD-10-CM

## 2021-03-10 DIAGNOSIS — E66.01 CLASS 2 SEVERE OBESITY DUE TO EXCESS CALORIES WITH SERIOUS COMORBIDITY AND BODY MASS INDEX (BMI) OF 37.0 TO 37.9 IN ADULT: ICD-10-CM

## 2021-03-10 RX ORDER — PRAVASTATIN SODIUM 40 MG/1
TABLET ORAL
Qty: 90 TABLET | Refills: 4 | Status: SHIPPED | OUTPATIENT
Start: 2021-03-10

## 2021-03-10 RX ORDER — DESVENLAFAXINE 100 MG/1
TABLET, EXTENDED RELEASE ORAL
Qty: 90 TABLET | Refills: 4 | Status: SHIPPED | OUTPATIENT
Start: 2021-03-10

## 2021-03-10 RX ORDER — SITAGLIPTIN 50 MG/1
TABLET, FILM COATED ORAL
Qty: 90 TABLET | Refills: 4 | Status: SHIPPED | OUTPATIENT
Start: 2021-03-10

## 2021-03-10 RX ORDER — CELECOXIB 200 MG/1
CAPSULE ORAL
Qty: 90 CAPSULE | Refills: 4 | Status: SHIPPED | OUTPATIENT
Start: 2021-03-10

## 2021-03-10 RX ORDER — LISINOPRIL 20 MG/1
TABLET ORAL
Qty: 90 TABLET | Refills: 4 | Status: SHIPPED | OUTPATIENT
Start: 2021-03-10

## 2021-04-06 ENCOUNTER — PATIENT OUTREACH (OUTPATIENT)
Dept: ADMINISTRATIVE | Facility: HOSPITAL | Age: 66
End: 2021-04-06

## 2021-04-14 DIAGNOSIS — I10 ESSENTIAL HYPERTENSION: Chronic | ICD-10-CM

## 2021-04-14 RX ORDER — HYDROCHLOROTHIAZIDE 25 MG/1
TABLET ORAL
Qty: 90 TABLET | Refills: 3 | OUTPATIENT
Start: 2021-04-14

## 2021-04-14 RX ORDER — ARIPIPRAZOLE 5 MG/1
5 TABLET ORAL DAILY
COMMUNITY
Start: 2021-03-02
